# Patient Record
Sex: MALE | Race: WHITE | NOT HISPANIC OR LATINO | Employment: FULL TIME | URBAN - METROPOLITAN AREA
[De-identification: names, ages, dates, MRNs, and addresses within clinical notes are randomized per-mention and may not be internally consistent; named-entity substitution may affect disease eponyms.]

---

## 2017-01-23 ENCOUNTER — OFFICE VISIT (OUTPATIENT)
Dept: FAMILY MEDICINE | Facility: CLINIC | Age: 32
End: 2017-01-23
Payer: COMMERCIAL

## 2017-01-23 VITALS
RESPIRATION RATE: 12 BRPM | HEIGHT: 71 IN | OXYGEN SATURATION: 99 % | TEMPERATURE: 98.7 F | BODY MASS INDEX: 30.45 KG/M2 | HEART RATE: 89 BPM | SYSTOLIC BLOOD PRESSURE: 140 MMHG | DIASTOLIC BLOOD PRESSURE: 80 MMHG | WEIGHT: 217.5 LBS

## 2017-01-23 DIAGNOSIS — F41.1 GENERALIZED ANXIETY DISORDER: ICD-10-CM

## 2017-01-23 DIAGNOSIS — R07.0 THROAT PAIN: Primary | ICD-10-CM

## 2017-01-23 PROCEDURE — 99214 OFFICE O/P EST MOD 30 MIN: CPT | Performed by: FAMILY MEDICINE

## 2017-01-23 PROCEDURE — 36415 COLL VENOUS BLD VENIPUNCTURE: CPT | Performed by: FAMILY MEDICINE

## 2017-01-23 PROCEDURE — 84443 ASSAY THYROID STIM HORMONE: CPT | Performed by: FAMILY MEDICINE

## 2017-01-23 RX ORDER — LORAZEPAM 0.5 MG/1
.5-1 TABLET ORAL
Qty: 14 TABLET | Refills: 0 | Status: SHIPPED | OUTPATIENT
Start: 2017-01-23 | End: 2017-02-07

## 2017-01-23 ASSESSMENT — ANXIETY QUESTIONNAIRES
GAD7 TOTAL SCORE: 17
1. FEELING NERVOUS, ANXIOUS, OR ON EDGE: NEARLY EVERY DAY
2. NOT BEING ABLE TO STOP OR CONTROL WORRYING: NEARLY EVERY DAY
3. WORRYING TOO MUCH ABOUT DIFFERENT THINGS: NEARLY EVERY DAY
5. BEING SO RESTLESS THAT IT IS HARD TO SIT STILL: SEVERAL DAYS
IF YOU CHECKED OFF ANY PROBLEMS ON THIS QUESTIONNAIRE, HOW DIFFICULT HAVE THESE PROBLEMS MADE IT FOR YOU TO DO YOUR WORK, TAKE CARE OF THINGS AT HOME, OR GET ALONG WITH OTHER PEOPLE: SOMEWHAT DIFFICULT
7. FEELING AFRAID AS IF SOMETHING AWFUL MIGHT HAPPEN: NEARLY EVERY DAY
6. BECOMING EASILY ANNOYED OR IRRITABLE: MORE THAN HALF THE DAYS

## 2017-01-23 ASSESSMENT — PATIENT HEALTH QUESTIONNAIRE - PHQ9: 5. POOR APPETITE OR OVEREATING: MORE THAN HALF THE DAYS

## 2017-01-23 NOTE — PROGRESS NOTES
"    SUBJECTIVE:                                                    Rosalio Odonnell is a 31 year old male who presents to clinic today for the following health issues:        Depression and Anxiety Follow-Up    Status since last visit: Worsened because of work in the last month     Other associated symptoms:tightness around throat    Complicating factors:     Significant life event: No     Current substance abuse: None    PHQ-9 SCORE 4/20/2012   Total Score 0     No flowsheet data found.     PHQ-9  English      PHQ-9   Any Language     GAD7         Amount of exercise or physical activity: 4-5 days/week for an average of 45-60 minutes    Problems taking medications regularly: No    Medication side effects: none    Diet: regular (no restrictions) was on a detox diet at the beginning of the month    Additional history/life update:    Throat pain:  Throat tightness over the last 2 weeks. Associated with feelings of anxiety. Has been experiencing increased levels of stress at work as an . Difficulty sleeping 2/2 general worry about health, work, etc. Concerned about thyroid disorder. States that his dad had thyroid issues (unspecified). No other symptoms other than \"throat tightness.\" Denies issues with swallowing or breathing difficulties. No wheezing. No heat or cold intolerance.     Generalized anxiety disorder :  History of generalized anxiety. Took SSRIs in past but discontinued these ~2 years ago as he felt they were not working. Continues to have intermittent symptoms of anxiety, but recently these have worsened with increased stress at work.       Problem list, Medication list, Allergies, and Medical/Social/Surgical histories reviewed in [facility] and updated as appropriate.  Labs reviewed in EPIC  BP Readings from Last 3 Encounters:   01/23/17 140/80   02/22/16 128/76   11/11/15 126/76    Wt Readings from Last 3 Encounters:   01/23/17 217 lb 8 oz (98.657 kg)   02/22/16 209 lb 8 oz (95.029 kg)   11/11/15 224 lb " "(101.606 kg)                  Patient Active Problem List   Diagnosis     Generalized anxiety disorder     CARDIOVASCULAR SCREENING; LDL GOAL LESS THAN 160     History reviewed. No pertinent past surgical history.    Social History   Substance Use Topics     Smoking status: Never Smoker      Smokeless tobacco: Never Used     Alcohol Use: Yes      Comment: rare     History reviewed. No pertinent family history.      Current Outpatient Prescriptions   Medication Sig Dispense Refill     LORazepam (ATIVAN) 0.5 MG tablet Take 1-2 tablets (0.5-1 mg) by mouth nightly as needed for anxiety 14 tablet 0     FLUOXETINE HCL PO        PARoxetine (PAXIL) 40 MG tablet Take by mouth every morning 30 tablet      Allergies   Allergen Reactions     Amoxicillin Rash     Recent Labs   Lab Test  11/11/15   1311  02/10/14   1514   LDL  69  79   HDL  60  59   TRIG  106  53   TSH   --   2.27        REVIEW OF SYSTEMS FOR GENERAL, RESPIRATORY, CV, GI AND PSYCHIATRIC NEGATIVE EXCEPT AS NOTED IN HPI.       OBJECTIVE:  /80 mmHg  Pulse 89  Temp(Src) 98.7  F (37.1  C) (Oral)  Resp 12  Ht 5' 10.75\" (1.797 m)  Wt 217 lb 8 oz (98.657 kg)  BMI 30.55 kg/m2  SpO2 99%    EXAM:  GENERAL APPEARANCE: obese young man, pleasant and interactive, alert appears slightly anxious but not in any acute distress  RESP: lungs clear to auscultation - no rales, rhonchi or wheezes  CV: regular rates and rhythm, normal S1 S2, no S3 or S4 and no murmur, click or rub -  NECK:  Supple. Full range of motion. Appears normal. No evidence of goiter or lymphadenopathy. Swallows without issue. No stridor.   No thyroid masses.       ASSESSMENT AND PLAN  Patient Instructions   ASSESSMENT AND PLAN  1. Throat pain  Thyroid labs today.      Overall seems related to acute anxiety although may have contribution of heartburn/acid or esophageal spasm which can go along with this.     Treat by taking ranitidine over the counter 150mg twice daily for one week and also using " lorazepam 1-2 tabs at night for one week to reduce anxiety level and re-gain sleep.  (only sleeping 2-4 hours/night due to this. )     2. Generalized anxiety disorder    - LORazepam (ATIVAN) 0.5 MG tablet; Take 1-2 tablets (0.5-1 mg) by mouth nightly as needed for anxiety  Dispense: 14 tablet; Refill: 0    Discussed risks of lorazepam, sedation, do not drive after taking.     please put in Bahai for 2:20 on february 7th for anxi  ety fu    Continue paroxetine for now.     MYCHART SIGNUP FOR E-VISITS AND EASIER COMMUNICATION:  [email or web address]     Zipnosis:  [employer].U4EA.Fredio.  Sign up for e-visits for common illnesses!     RADIOLOGY:   [facility]:  [phone] to schedule any radiology tests at [facility]: [phone]    Mammograms/colonoscopies:  [phone]    CONSUMER PRICE LINE for estimates of test costs:  [phone]                Scribed by Tanmay Garcia, Medical Student for Joel Wegener, MD based on the provider s statements to me.        I have reviewed and edited the medical student s documentation acting as scribe and verify that the history, exam and medical decision making reflect the history, exam and decision making performed by myself today.

## 2017-01-23 NOTE — MR AVS SNAPSHOT
After Visit Summary   1/23/2017    Rosalio Odonnell    MRN: [id number]           Patient Information     Date Of Birth          1985        Visit Information        Provider Department      1/23/2017 3:40 PM Wegener, Joel Daniel Irwin, MD [facility] [place]        Today's Diagnoses     Throat pain    -  1     Generalized anxiety disorder           Care Instructions    ASSESSMENT AND PLAN  1. Throat pain  Thyroid labs today.      Overall seems related to acute anxiety although may have contribution of heartburn/acid or esophageal spasm which can go along with this.     Treat by taking ranitidine over the counter 150mg twice daily for one week and also using lorazepam 1-2 tabs at night for one week to reduce anxiety level and re-gain sleep.  (only sleeping 2-4 hours/night due to this. )     2. Generalized anxiety disorder    - LORazepam (ATIVAN) 0.5 MG tablet; Take 1-2 tablets (0.5-1 mg) by mouth nightly as needed for anxiety  Dispense: 14 tablet; Refill: 0    please put in Yarsanism for 2:20 on february 7th for anxiety fu      [place] SIGNUP FOR E-VISITS AND EASIER COMMUNICATION:  [email or web address]     Zipnosis:  [place].[employer].Searcheeze.  Sign up for e-visits for common illnesses!     RADIOLOGY:   [facility]:  [phone] to schedule any radiology tests at [facility]: [phone]    Mammograms/colonoscopies:  [phone]    CONSUMER PRICE LINE for estimates of test costs:  [phone]               Follow-ups after your visit        Who to contact     If you have questions or need follow up information about today's clinic visit or your schedule please contact [facility] directly at [phone].  Normal or non-critical lab and imaging results will be communicated to you by MyChart, letter or phone within 4 business days after the clinic has received the results. If you do not hear from us within 7 days, please contact the clinic through  "MyChart or phone. If you have a critical or abnormal lab result, we will notify you by phone as soon as possible.  Submit refill requests through [employer] or call your pharmacy and they will forward the refill request to us. Please allow 3 business days for your refill to be completed.          Additional Information About Your Visit        PlayB[employer] Information     [employer] gives you secure access to your electronic health record. If you see a primary care provider, you can also send messages to your care team and make appointments. If you have questions, please call your primary care clinic.  If you do not have a primary care provider, please call [phone] and they will assist you.        Care EveryWhere ID     This is your Care EveryWhere ID. This could be used by other organizations to access your [facility] medical records  [id number]        Your Vitals Were     Pulse Temperature Respirations Height BMI (Body Mass Index) Pulse Oximetry    89 98.7  F (37.1  C) (Oral) 12 5' 10.75\" (1.797 m) 30.55 kg/m2 99%       Blood Pressure from Last 3 Encounters:   01/23/17 140/80   02/22/16 128/76   11/11/15 126/76    Weight from Last 3 Encounters:   01/23/17 217 lb 8 oz (98.657 kg)   02/22/16 209 lb 8 oz (95.029 kg)   11/11/15 224 lb (101.606 kg)              We Performed the Following     TSH with free T4 reflex          Today's Medication Changes          These changes are accurate as of: 1/23/17  4:46 PM.  If you have any questions, ask your nurse or doctor.               Start taking these medicines.        Dose/Directions    LORazepam 0.5 MG tablet   Commonly known as:  ATIVAN   Used for:  Generalized anxiety disorder   Started by:  Wegener, Joel Daniel Irwin, MD        Dose:  0.5-1 mg   Take 1-2 tablets (0.5-1 mg) by mouth nightly as needed for anxiety   Quantity:  14 tablet   Refills:  0            Where to get your medicines      Some of these will need a paper prescription and others can be bought over the counter. "  Ask your nurse if you have questions.     Bring a paper prescription for each of these medications    - LORazepam 0.5 MG tablet             Primary Care Provider Office Phone # Fax #    Joel Daniel Irwin Wegener, [phone]       [facility] [address] [facility] [address]        Thank you!     Thank you for choosing [facility]  for your care. Our goal is always to provide you with excellent care. Hearing back from our patients is one way we can continue to improve our services. Please take a few minutes to complete the written survey that you may receive in the mail after your visit with us. Thank you!             Your Updated Medication List - Protect others around you: Learn how to safely use, store and throw away your medicines at www.disposemymeds.org.          This list is accurate as of: 1/23/17  4:46 PM.  Always use your most recent med list.                   Brand Name Dispense Instructions for use    FLUOXETINE HCL PO          LORazepam 0.5 MG tablet    ATIVAN    14 tablet    Take 1-2 tablets (0.5-1 mg) by mouth nightly as needed for anxiety       PARoxetine 40 MG tablet    PAXIL    30 tablet    Take by mouth every morning

## 2017-01-23 NOTE — PATIENT INSTRUCTIONS
ASSESSMENT AND PLAN  1. Throat pain  Thyroid labs today.      Overall seems related to acute anxiety although may have contribution of heartburn/acid or esophageal spasm which can go along with this.     Treat by taking ranitidine over the counter 150mg twice daily for one week and also using lorazepam 1-2 tabs at night for one week to reduce anxiety level and re-gain sleep.  (only sleeping 2-4 hours/night due to this. )     2. Generalized anxiety disorder    - LORazepam (ATIVAN) 0.5 MG tablet; Take 1-2 tablets (0.5-1 mg) by mouth nightly as needed for anxiety  Dispense: 14 tablet; Refill: 0    please put in Jewish for 2:20 on february 7th for anxiety fu      [place] SIGNUP FOR E-VISITS AND EASIER COMMUNICATION:  [email or web address]     Zipnosis:  [place].[employer]ivalSoft.  Sign up for e-visits for common illnesses!     RADIOLOGY:   [employer]:  [phone] to schedule any radiology tests at [facility]: [phone]    Mammograms/colonoscopies:  [phone]    CONSUMER PRICE LINE for estimates of test costs:  [phone]

## 2017-01-23 NOTE — NURSING NOTE
"Chief Complaint   Patient presents with     Anxiety     Depression       Initial /80 mmHg  Pulse 89  Temp(Src) 98.7  F (37.1  C) (Oral)  Resp 12  Ht 5' 10.75\" (1.797 m)  Wt 217 lb 8 oz (98.657 kg)  BMI 30.55 kg/m2  SpO2 99% Estimated body mass index is 30.55 kg/(m^2) as calculated from the following:    Height as of this encounter: 5' 10.75\" (1.797 m).    Weight as of this encounter: 217 lb 8 oz (98.657 kg).  BP completed using cuff size: SMA Gaetano    "

## 2017-01-24 LAB — TSH SERPL DL<=0.005 MIU/L-ACNC: 2.21 MU/L (ref 0.4–4)

## 2017-01-24 ASSESSMENT — ANXIETY QUESTIONNAIRES: GAD7 TOTAL SCORE: 17

## 2017-01-24 ASSESSMENT — PATIENT HEALTH QUESTIONNAIRE - PHQ9: SUM OF ALL RESPONSES TO PHQ QUESTIONS 1-9: 9

## 2017-02-06 NOTE — PROGRESS NOTES
"  SUBJECTIVE:                                                    Rosalio Odonnell is a 31 year old male who presents to clinic today for the following health issues:      Anxiety Follow-Up    Status since last visit: Improved slighly    Other associated symptoms:None    Complicating factors:   Significant life event: No   Current substance abuse: None  Depression symptoms: No  ANGELINA-7 SCORE 1/23/2017   Total Score 17        GAD7       Amount of exercise or physical activity: 6-7 days/week for an average of 45-60 minutes    Problems taking medications regularly: No    Medication side effects: none    Diet: regular (no restrictions)    Additional history/life update:    Generalized anxiety disorder :  Anxiety improved but still experiences symptoms of anxiety triggered by small things (a pimple on his leg) and then exacerbated by anxious thoughts. He reports that the lorazepam appears to have helped a lot when it was \"really bad\" and to help sleep.  Has tried hard to limit taking this however.         Problem list, Medication list, Allergies, and Medical/Social/Surgical histories reviewed in [facility] and updated as appropriate.  Labs reviewed in EPIC  BP Readings from Last 3 Encounters:   02/07/17 138/86   01/23/17 140/80   02/22/16 128/76    Wt Readings from Last 3 Encounters:   02/07/17 213 lb 8 oz (96.843 kg)   01/23/17 217 lb 8 oz (98.657 kg)   02/22/16 209 lb 8 oz (95.029 kg)                  Patient Active Problem List   Diagnosis     Generalized anxiety disorder     CARDIOVASCULAR SCREENING; LDL GOAL LESS THAN 160     No past surgical history on file.    Social History   Substance Use Topics     Smoking status: Never Smoker      Smokeless tobacco: Never Used     Alcohol Use: Yes      Comment: rare     No family history on file.      Current Outpatient Prescriptions   Medication Sig Dispense Refill     FLUOXETINE HCL PO        PARoxetine (PAXIL) 40 MG tablet Take by mouth every morning 30 tablet      Allergies   Allergen " "Reactions     Amoxicillin Rash     Recent Labs   Lab Test  01/23/17   1649  11/11/15   1311  02/10/14   1514   LDL   --   69  79   HDL   --   60  59   TRIG   --   106  53   TSH  2.21   --   2.27        REVIEW OF SYSTEMS FOR GENERAL, RESPIRATORY, CV, GI AND PSYCHIATRIC NEGATIVE EXCEPT AS NOTED IN HPI.       OBJECTIVE:  /86 mmHg  Pulse 61  Temp(Src) 97.7  F (36.5  C) (Oral)  Resp 20  Ht 5' 10.75\" (1.797 m)  Wt 213 lb 8 oz (96.843 kg)  BMI 29.99 kg/m2  SpO2 98%    EXAM:  GENERAL APPEARANCE: healthy, alert and no distress  RESP: lungs clear to auscultation - no rales, rhonchi or wheezes  CV: regular rates and rhythm, normal S1 S2, no S3 or S4 and no murmur, click or rub -    MENTAL STATUS EXAM  Appearance: appropriate  Attitude: cooperative  Behavior: normal  Eyre Contact: normal  Speech: normal, clear, not slurred.   Orientation: oreinted to person , place, time and situation  Mood:  admits little sadness but some anxiety most of time  Affect: Mood Congruient  Thought Process: clear  Suicidal Ideation: reports no thoughts, no intention  Hallucination: no      ASSESSMENT AND PLAN  1. Generalized anxiety disorder  Overall much improved. Used only 1/2 lorazepam.     Discussed options.  Will add night journaling \"out with the bad and in with the good\" for 5 min/night.     Was not taking any controlled medications.  Used both paxil and fluoxetine in the past, most recently fluoxetine 40mg.     No side effects that he remembers.     Start fluoxetine 20mg daily.  Common side effects nausea/stomach upset.  Sometimes erectile dysfunction     Follow up one month by e-visit.     Refilled small amount of lorazepam just in case.  Do not drive after taking or mix with alcohol.     Could also consider psychotherapy, he may ask insurance about coverage first.     please put in Islam for anxiety fu at 2:00 on july 10th      Scribed by Tanmay Garcia, Medical Student for Joel Wegener, MD based on the provider s statements " to me.       I have reviewed and edited the medical student s documentation acting as scribe and verify that the history, exam and medical decision making reflect the history, exam and decision making performed by myself today.    Spent more than 1/2 of 25 minutes counseling/coordination of care regarding anxiety today.     Joel Wegener,MD

## 2017-02-07 ENCOUNTER — OFFICE VISIT (OUTPATIENT)
Dept: FAMILY MEDICINE | Facility: CLINIC | Age: 32
End: 2017-02-07
Payer: COMMERCIAL

## 2017-02-07 VITALS
TEMPERATURE: 97.7 F | SYSTOLIC BLOOD PRESSURE: 138 MMHG | WEIGHT: 213.5 LBS | RESPIRATION RATE: 20 BRPM | DIASTOLIC BLOOD PRESSURE: 86 MMHG | HEART RATE: 61 BPM | BODY MASS INDEX: 29.89 KG/M2 | HEIGHT: 71 IN | OXYGEN SATURATION: 98 %

## 2017-02-07 DIAGNOSIS — F41.1 GENERALIZED ANXIETY DISORDER: Primary | ICD-10-CM

## 2017-02-07 PROCEDURE — 99214 OFFICE O/P EST MOD 30 MIN: CPT | Performed by: FAMILY MEDICINE

## 2017-02-07 RX ORDER — LORAZEPAM 0.5 MG/1
.5-1 TABLET ORAL
Qty: 14 TABLET | Refills: 0 | Status: SHIPPED | OUTPATIENT
Start: 2017-02-07 | End: 2018-02-20

## 2017-02-07 NOTE — NURSING NOTE
"Chief Complaint   Patient presents with     Anxiety       Initial /86 mmHg  Pulse 61  Temp(Src) 97.7  F (36.5  C) (Oral)  Resp 20  Ht 5' 10.75\" (1.797 m)  Wt 213 lb 8 oz (96.843 kg)  BMI 29.99 kg/m2  SpO2 98% Estimated body mass index is 29.99 kg/(m^2) as calculated from the following:    Height as of this encounter: 5' 10.75\" (1.797 m).    Weight as of this encounter: 213 lb 8 oz (96.843 kg).  Medication Reconciliation: complete   Carlee Ann CMA      "

## 2017-02-07 NOTE — PATIENT INSTRUCTIONS
"ASSESSMENT AND PLAN  1. Generalized anxiety disorder  Overall much improved. Used only 1/2 lorazepam.     Discussed options.  Will add night journaling \"out with the bad and in with the good\" for 5 min/night.     Was not taking any controlled medications.  Used both paxil and fluoxetine in the past, most recently fluoxetine 40mg.     No side effects that he remembers.     Start fluoxetine 20mg daily.  Common side effects nausea/stomach upset.  Sometimes erectile dysfunction     Follow up one month by e-visit.     Refilled small amount of lorazepam just in case.  Do not drive after taking or mix with alcohol.     Could also consider psychotherapy, he may ask insurance about coverage first.     please put in Yazidi for anxiety fu at 2:00 on july 10th        [facility] SIGNUP FOR E-VISITS AND EASIER COMMUNICATION:  [email or web address]     Zipnosis:  [place].[employer].9Lenses.  Sign up for e-visits for common illnesses!     RADIOLOGY:   [facility]:  [phone] to schedule any radiology tests at [facility]: [phone]    Mammograms/colonoscopies:  [phone]    CONSUMER PRICE LINE for estimates of test costs:  [phone]         "

## 2017-02-07 NOTE — MR AVS SNAPSHOT
"              After Visit Summary   2/7/2017    Carri Odonnell    MRN: [id number]           Patient Information     Date Of Birth          1985        Visit Information        Provider Department      2/7/2017 2:20 PM Wegener, Joel Daniel Irwin, MD [facility]        Today's Diagnoses     Generalized anxiety disorder    -  1       Care Instructions    ASSESSMENT AND PLAN  1. Generalized anxiety disorder  Overall much improved. Used only 1/2 lorazepam.     Discussed options.  Will add night journaling \"out with the bad and in with the good\" for 5 min/night.     Was not taking any controlled medications.  Used both paxil and fluoxetine in the past, most recently fluoxetine 40mg.     No side effects that he remembers.     Start fluoxetine 20mg daily.  Common side effects nausea/stomach upset.  Sometimes erectile dysfunction     Follow up one month by e-visit.     Refilled small amount of lorazepam just in case.  Do not drive after taking or mix with alcohol.     please put in carri for anxiety fu at 2:00 on july 10th        [employer] SIGNUP FOR E-VISITS AND EASIER COMMUNICATION:  [email or web address]     Zipnosis:  [place].[employer].  Sign up for e-visits for common illnesses!     RADIOLOGY:   [facility]:  [phone] to schedule any radiology tests at [facility]: [phone]    Mammograms/colonoscopies:  [phone]    CONSUMER PRICE LINE for estimates of test costs:  [phone]               Follow-ups after your visit        Who to contact     If you have questions or need follow up information about today's clinic visit or your schedule please contact [facility] directly at [phone].  Normal or non-critical lab and imaging results will be communicated to you by MyChart, letter or phone within 4 business days after the clinic has received the results. If you do not hear from us within 7 days, please contact the clinic through [employer] or " "phone. If you have a critical or abnormal lab result, we will notify you by phone as soon as possible.  Submit refill requests through [facility] or call your pharmacy and they will forward the refill request to us. Please allow 3 business days for your refill to be completed.          Additional Information About Your Visit        Allegiancehart Information     [facility] gives you secure access to your electronic health record. If you see a primary care provider, you can also send messages to your care team and make appointments. If you have questions, please call your primary care clinic.  If you do not have a primary care provider, please call [phone] and they will assist you.        Care EveryWhere ID     This is your Care EveryWhere ID. This could be used by other organizations to access your [place] medical records  [id number]        Your Vitals Were     Pulse Temperature Respirations Height BMI (Body Mass Index) Pulse Oximetry    61 97.7  F (36.5  C) (Oral) 20 5' 10.75\" (1.797 m) 29.99 kg/m2 98%       Blood Pressure from Last 3 Encounters:   02/07/17 138/86   01/23/17 140/80   02/22/16 128/76    Weight from Last 3 Encounters:   02/07/17 213 lb 8 oz (96.843 kg)   01/23/17 217 lb 8 oz (98.657 kg)   02/22/16 209 lb 8 oz (95.029 kg)              Today, you had the following     No orders found for display         Today's Medication Changes          These changes are accurate as of: 2/7/17  3:36 PM.  If you have any questions, ask your nurse or doctor.               Start taking these medicines.        Dose/Directions    FLUoxetine 20 MG capsule   Commonly known as:  PROzac   Used for:  Generalized anxiety disorder   Started by:  Wegener, Joel Daniel Irwin, MD        Dose:  20 mg   Take 1 capsule (20 mg) by mouth daily   Quantity:  90 capsule   Refills:  1       LORazepam 0.5 MG tablet   Commonly known as:  ATIVAN   Used for:  Generalized anxiety disorder   Started by:  Wegener, Joel Daniel Irwin, MD        Dose:  " 0.5-1 mg   Take 1-2 tablets (0.5-1 mg) by mouth nightly as needed for anxiety   Quantity:  14 tablet   Refills:  0            Where to get your medicines      These medications were sent to [facility]pharmacy #[id number] - [place], [place] - [address]  [address] [address]     Phone:  [phone]    - FLUoxetine 20 MG capsule      Some of these will need a paper prescription and others can be bought over the counter.  Ask your nurse if you have questions.     Bring a paper prescription for each of these medications    - LORazepam 0.5 MG tablet             Primary Care Provider Office Phone # Fax #    Joel Daniel Irwin Wegener, [phone]       [address] [address]        Thank you!     Thank you for choosing [facility]  for your care. Our goal is always to provide you with excellent care. Hearing back from our patients is one way we can continue to improve our services. Please take a few minutes to complete the written survey that you may receive in the mail after your visit with us. Thank you!             Your Updated Medication List - Protect others around you: Learn how to safely use, store and throw away your medicines at www.disposemymeds.org.          This list is accurate as of: 2/7/17  3:36 PM.  Always use your most recent med list.                   Brand Name Dispense Instructions for use    FLUoxetine 20 MG capsule    PROzac    90 capsule    Take 1 capsule (20 mg) by mouth daily       FLUOXETINE HCL PO          LORazepam 0.5 MG tablet    ATIVAN    14 tablet    Take 1-2 tablets (0.5-1 mg) by mouth nightly as needed for anxiety       PARoxetine 40 MG tablet    PAXIL    30 tablet    Take by mouth every morning

## 2017-02-22 ENCOUNTER — E-VISIT (OUTPATIENT)
Dept: FAMILY MEDICINE | Facility: CLINIC | Age: 32
End: 2017-02-22
Payer: COMMERCIAL

## 2017-02-22 DIAGNOSIS — F41.1 GENERALIZED ANXIETY DISORDER: ICD-10-CM

## 2017-02-22 DIAGNOSIS — M77.10 LATERAL EPICONDYLITIS, UNSPECIFIED LATERALITY: Primary | ICD-10-CM

## 2017-02-22 PROCEDURE — 99444 ZZC PHYSICIAN ONLINE EVALUATION & MANAGEMENT SERVICE: CPT | Performed by: FAMILY MEDICINE

## 2017-02-23 ENCOUNTER — OFFICE VISIT (OUTPATIENT)
Dept: URGENT CARE | Facility: URGENT CARE | Age: 32
End: 2017-02-23
Payer: COMMERCIAL

## 2017-02-23 VITALS
HEIGHT: 71 IN | OXYGEN SATURATION: 98 % | TEMPERATURE: 99.1 F | WEIGHT: 210 LBS | BODY MASS INDEX: 29.4 KG/M2 | DIASTOLIC BLOOD PRESSURE: 87 MMHG | SYSTOLIC BLOOD PRESSURE: 136 MMHG | HEART RATE: 66 BPM

## 2017-02-23 DIAGNOSIS — S56.912A STRAIN OF LEFT FOREARM, INITIAL ENCOUNTER: Primary | ICD-10-CM

## 2017-02-23 PROCEDURE — 99213 OFFICE O/P EST LOW 20 MIN: CPT | Performed by: FAMILY MEDICINE

## 2017-02-23 NOTE — MR AVS SNAPSHOT
After Visit Summary   2/23/2017    Rosalio Odonnell    MRN: [id number]           Patient Information     Date Of Birth          1985        Visit Information        Provider Department      2/23/2017 6:15 PM Angela Cervantes MD [facility]        Today's Diagnoses     Strain of left forearm, initial encounter    -  1       Follow-ups after your visit        Your next 10 appointments already scheduled     Jul 10, 2017  2:00 PM CDT   Office Visit with Joel Daniel Irwin Wegener, MD   [facility] ([facility])    [address] [place] [id number] [phone]           Bring a current list of meds and any records pertaining to this visit.  For Physicals, please bring immunization records and any forms needing to be filled out.  Please arrive 10 minutes early to complete paperwork.              Who to contact     If you have questions or need follow up information about today's clinic visit or your schedule please contact [facility] directly at [phone].  Normal or non-critical lab and imaging results will be communicated to you by [employer], letter or phone within 4 business days after the clinic has received the results. If you do not hear from us within 7 days, please contact the clinic through [employer] or phone. If you have a critical or abnormal lab result, we will notify you by phone as soon as possible.  Submit refill requests through [employer] or call your pharmacy and they will forward the refill request to us. Please allow 3 business days for your refill to be completed.          Additional Information About Your Visit        [employer] Information     [employer] gives you secure access to your electronic health record. If you see a primary care provider, you can also send messages to your care team and make appointments. If you have questions, please call your primary care clinic.  If you do not  "have a primary care provider, please call [phone] and they will assist you.        Care EveryWhere ID     This is your Care EveryWhere ID. This could be used by other organizations to access your [place] medical records  [id number]        Your Vitals Were     Pulse Temperature Height Pulse Oximetry BMI (Body Mass Index)       66 99.1  F (37.3  C) (Tympanic) 5' 11\" (1.803 m) 98% 29.29 kg/m2        Blood Pressure from Last 3 Encounters:   02/23/17 136/87   02/07/17 138/86   01/23/17 140/80    Weight from Last 3 Encounters:   02/23/17 210 lb (95.3 kg)   02/07/17 213 lb 8 oz (96.8 kg)   01/23/17 217 lb 8 oz (98.7 kg)              Today, you had the following     No orders found for display       Primary Care Provider Office Phone # Fax #    Joel Daniel Irwin Wegener, [phone]       [place] [phone]        Thank you!     Thank you for choosing [facility]  for your care. Our goal is always to provide you with excellent care. Hearing back from our patients is one way we can continue to improve our services. Please take a few minutes to complete the written survey that you may receive in the mail after your visit with us. Thank you!             Your Updated Medication List - Protect others around you: Learn how to safely use, store and throw away your medicines at www.disposemymeds.org.          This list is accurate as of: 2/23/17  7:57 PM.  Always use your most recent med list.                   Brand Name Dispense Instructions for use    FLUoxetine 20 MG capsule    PROzac    90 capsule    Take 1 capsule (20 mg) by mouth daily       FLUOXETINE HCL PO      Reported on 2/23/2017       LORazepam 0.5 MG tablet    ATIVAN    14 tablet    Take 1-2 tablets (0.5-1 mg) by mouth nightly as needed for anxiety       PARoxetine 40 MG tablet    PAXIL    30 tablet    Take by mouth every morning Reported on 2/23/2017         "

## 2017-02-24 NOTE — NURSING NOTE
"Chief Complaint   Patient presents with     Urgent Care     Elbow Pain     c/o elbow sore for 3 days , no injury       Initial /87 (BP Location: Left arm, Patient Position: Chair, Cuff Size: Adult Large)  Pulse 66  Temp 99.1  F (37.3  C) (Tympanic)  Ht 5' 11\" (1.803 m)  Wt 210 lb (95.3 kg)  SpO2 98%  BMI 29.29 kg/m2 Estimated body mass index is 29.29 kg/(m^2) as calculated from the following:    Height as of this encounter: 5' 11\" (1.803 m).    Weight as of this encounter: 210 lb (95.3 kg).  Medication Reconciliation: complete   Brenda Cuellar MA      "

## 2017-02-24 NOTE — PROGRESS NOTES
SUBJECTIVE:  Rosalio Odonnell is a 31 year old male who c/o left elbow area tightness and soreness lateral > medial aspect for the past 3 days. Mechanism of injury: increased his work out regiment to 100 push ups using weights about 7 days ago. Immediate symptoms: delayed pain, was able to use arm directly after injury. Symptoms have been improving since that time.  Has been using Ibuprofen.  He is concerned that although the pain is better, he feels medial left elbow twitching once in a while.  He is right handed.  Denies tingling of hands.  Prior history of related problems: no prior problems with this area in the past.    OBJECTIVE:  Vital signs as noted above.  Appearance: in no apparent distress.  Left elbow exam: mild lateral epicondylar tenderness, mild medial epicondylar tenderness.  Full elbow ROM.  Mild forearm extensor muscle tenderness.  Normal strength.  Normal radial pulse.  Normal ipsilateral wrist exam.  No twiching noted.     X-ray: not indicated.    ASSESSMENT:  Left forearm strain    PLAN:  NSAID, ice suggested  activity modification  see primary care physician in follow up if symptoms do not improve in the next week or sooner if worsening.   See orders in EpicCare.  Angela Hidalgo MD

## 2017-03-08 ENCOUNTER — OFFICE VISIT (OUTPATIENT)
Dept: FAMILY MEDICINE | Facility: CLINIC | Age: 32
End: 2017-03-08
Payer: COMMERCIAL

## 2017-03-08 VITALS
HEART RATE: 92 BPM | DIASTOLIC BLOOD PRESSURE: 92 MMHG | TEMPERATURE: 98.2 F | WEIGHT: 214 LBS | OXYGEN SATURATION: 99 % | SYSTOLIC BLOOD PRESSURE: 154 MMHG | RESPIRATION RATE: 16 BRPM | BODY MASS INDEX: 29.85 KG/M2

## 2017-03-08 DIAGNOSIS — F41.1 GAD (GENERALIZED ANXIETY DISORDER): ICD-10-CM

## 2017-03-08 DIAGNOSIS — R25.3 MUSCLE TWITCHING: Primary | ICD-10-CM

## 2017-03-08 PROCEDURE — 99213 OFFICE O/P EST LOW 20 MIN: CPT | Performed by: NURSE PRACTITIONER

## 2017-03-08 RX ORDER — CITALOPRAM HYDROBROMIDE 20 MG/1
TABLET ORAL
Qty: 30 TABLET | Refills: 1 | Status: SHIPPED | OUTPATIENT
Start: 2017-03-08 | End: 2017-04-07

## 2017-03-08 NOTE — MR AVS SNAPSHOT
After Visit Summary   3/8/2017    Rosalio Odonnell    MRN: [id number]           Patient Information     Date Of Birth          1985        Visit Information        Provider Department      3/8/2017 11:00 AM Amy Jimenez APRN [facility]        Today's Diagnoses     ANGELINA (generalized anxiety disorder)    -  1    Muscle twitching          Care Instructions    1.  I think twitching is related to recent increase in exercise, make sure you stretch before and after workouts, in the morning, and before bed.    2.  For anxiety let's try a less activating SSRI called celexa.  Take 1-mg (1/2 tab) for 1 week then increase to a full tab.  It will take 4-6 weeks to see full effect.  Side effects include decreased sex drive, drowsiness, weight gain, insomnia, anxiety, dizziness, headache, and nausea.  Some of these get better after the first 2-4 weeks.  We may have to try several different medications before we find the one that's right for you.      3.  Follow up in 1 month for recheck    4.  Consider establishing with a counselor             Follow-ups after your visit        Additional Services     MENTAL HEALTH REFERRAL       Your provider has referred you to: [facility]: [facility] - Counseling (Individual/Couples/Family) - [facility] [phone]   [email or web address]   *Patient will be contacted by [facility] scheduling partner[facility]), to schedule an appointment.  Patients may also call [facility] to schedule.    All scheduling is subject to the client's specific insurance plan & benefits, provider/location availability, and provider clinical specialities.  Please arrive 15 minutes early for your first appointment and bring your completed paperwork.    Please be aware that coverage of these services is subject to the terms and limitations of your health insurance plan.  Call  member services at your health plan with any benefit or coverage questions.                  Your next 10 appointments already scheduled     Jul 10, 2017  2:00 PM CDT   Office Visit with Joel Daniel Irwin Wegener, MD   [facility] ([facility])    [address] [place] [id number] [phone]           Bring a current list of meds and any records pertaining to this visit.  For Physicals, please bring immunization records and any forms needing to be filled out.  Please arrive 10 minutes early to complete paperwork.              Who to contact     If you have questions or need follow up information about today's clinic visit or your schedule please contact [facility] directly at [phone].  Normal or non-critical lab and imaging results will be communicated to you by MyChart, letter or phone within 4 business days after the clinic has received the results. If you do not hear from us within 7 days, please contact the clinic through [employer] or phone. If you have a critical or abnormal lab result, we will notify you by phone as soon as possible.  Submit refill requests through [employer] or call your pharmacy and they will forward the refill request to us. Please allow 3 business days for your refill to be completed.          Additional Information About Your Visit        [employer] Information     [employer] gives you secure access to your electronic health record. If you see a primary care provider, you can also send messages to your care team and make appointments. If you have questions, please call your primary care clinic.  If you do not have a primary care provider, please call [phone] and they will assist you.        Care EveryWhere ID     This is your Care EveryWhere ID. This could be used by other organizations to access your [place] medical records  [id number]        Your Vitals Were     Pulse Temperature Respirations Pulse Oximetry BMI (Body Mass  Index)       92 98.2  F (36.8  C) (Tympanic) 16 99% 29.85 kg/m2        Blood Pressure from Last 3 Encounters:   03/08/17 (!) 154/92   02/23/17 136/87   02/07/17 138/86    Weight from Last 3 Encounters:   03/08/17 214 lb (97.1 kg)   02/23/17 210 lb (95.3 kg)   02/07/17 213 lb 8 oz (96.8 kg)              We Performed the Following     MENTAL HEALTH REFERRAL          Today's Medication Changes          These changes are accurate as of: 3/8/17 11:22 AM.  If you have any questions, ask your nurse or doctor.               Start taking these medicines.        Dose/Directions    citalopram 20 MG tablet   Commonly known as:  celeXA   Used for:  ANGELINA (generalized anxiety disorder)   Started by:  Aym Jimenez APRN CNP        Take 1/2 tablet (10 mg) for 1-2 weeks, then increase to 1 tablet orally daily   Quantity:  30 tablet   Refills:  1            Where to get your medicines      These medications were sent to [facility]/pharmacy #[id number] [address] [address]     Phone:  [phone]     citalopram 20 MG tablet                Primary Care Provider Office Phone # Fax #    Joel Daniel Irwin Wegener, [phone]       [facility] [address] [place] [id number]        Thank you!     Thank you for choosing [facility]  for your care. Our goal is always to provide you with excellent care. Hearing back from our patients is one way we can continue to improve our services. Please take a few minutes to complete the written survey that you may receive in the mail after your visit with us. Thank you!             Your Updated Medication List - Protect others around you: Learn how to safely use, store and throw away your medicines at www.disposemymeds.org.          This list is accurate as of: 3/8/17 11:22 AM.  Always use your most recent med list.                   Brand Name Dispense Instructions for use    citalopram 20 MG tablet     celeXA    30 tablet    Take 1/2 tablet (10 mg) for 1-2 weeks, then increase to 1 tablet orally daily       FLUoxetine 20 MG capsule    PROzac    90 capsule    Take 1 capsule (20 mg) by mouth daily       FLUOXETINE HCL PO      Reported on 3/8/2017       LORazepam 0.5 MG tablet    ATIVAN    14 tablet    Take 1-2 tablets (0.5-1 mg) by mouth nightly as needed for anxiety       PARoxetine 40 MG tablet    PAXIL    30 tablet    Take by mouth every morning Reported on 3/8/2017

## 2017-03-08 NOTE — PROGRESS NOTES
SUBJECTIVE:                                                    Rosalio Odonnell is a 31 year old male who presents to clinic today for the following health issues:      Musculoskeletal problem/pain      Duration: few days    Description  Location: spasms mostly in right leg, some in left    Intensity:  No pain    Accompanying signs and symptoms: none    History  Previous similar problem: YES- similar issue in left elbow prior  Previous evaluation:  none    Precipitating or alleviating factors:  Trauma or overuse: YES- has been training for marathon, increased weight lifting and running  Aggravating factors include: none    Therapies tried and outcome: foam rolling, rest, increased potassium and magnesium in diet    Pt notes history of ANGELINA which is worsened by fear of this possibly being a serious problem       Leg twitching is triggering anxiety.    Twitching has improved today.  Did a lot of lifting Saturday.  Sunday ran 10-11 miles.  Monday and yesterday developed muscle twitching to both legs.  Now it is just in his right leg.  Only notices it at rest.  He can physically see muscle twitching.  No weakness, feels he could run 10 miles right now.  No weakness.    No vision changes, no balance or gait changes, no paresthesias.  No headaches.      Elbow symptoms have resolved.    Restarted fluoxetine 2/2016, feels this worsened anxiety, waking up with drenching night sweats.  Stopped taking the fluoxetine 2 weeks ago.  Has been on paxil in the past, thinks it didn't work well.      Patient Active Problem List   Diagnosis     Generalized anxiety disorder     CARDIOVASCULAR SCREENING; LDL GOAL LESS THAN 160     History reviewed. No pertinent past surgical history.    Social History   Substance Use Topics     Smoking status: Never Smoker     Smokeless tobacco: Never Used     Alcohol use Yes      Comment: rare     History reviewed. No pertinent family history.      Current Outpatient Prescriptions   Medication Sig  Dispense Refill     citalopram (CELEXA) 20 MG tablet Take 1/2 tablet (10 mg) for 1-2 weeks, then increase to 1 tablet orally daily 30 tablet 1     LORazepam (ATIVAN) 0.5 MG tablet Take 1-2 tablets (0.5-1 mg) by mouth nightly as needed for anxiety 14 tablet 0     FLUoxetine (PROZAC) 20 MG capsule Take 1 capsule (20 mg) by mouth daily (Patient not taking: Reported on 3/8/2017) 90 capsule 1     FLUOXETINE HCL PO Reported on 3/8/2017       PARoxetine (PAXIL) 40 MG tablet Take by mouth every morning Reported on 3/8/2017 30 tablet        ROS:  MSK, neuro, psych as above, otherwise negative       OBJECTIVE:                                                    BP (!) 154/92 (BP Location: Right arm, Patient Position: Chair, Cuff Size: Adult Regular)  Pulse 92  Temp 98.2  F (36.8  C) (Tympanic)  Resp 16  Wt 214 lb (97.1 kg)  SpO2 99%  BMI 29.85 kg/m2   GENERAL APPEARANCE: healthy, alert and no distress  EYES: Eyes grossly normal to inspection and conjunctivae and sclerae normal  RESP: respirations nonlabored   ORTHO: bilateral lower legs without ecchymosis, erythema, or edema  NEURO: Normal strength and tone, mentation intact, speech normal, DTR symmetrically normal in lower extremities, gait normal including heel/toe/tandem walking, cranial nerves 2-12 intact, Romberg negative and rapid alternating movements normal, normal strength throughout  PSYCH: mentation appears normal and affect normal/bright        ASSESSMENT/PLAN:                                                    (R25.3) Muscle twitching  (primary encounter diagnosis)  Comment: suspect related to increased activity/marathon training, symptoms already improving.   Plan: reassured, reviewed stretching routine in the morning, before bed, and before/after exercise     (F41.1) ANGELINA (generalized anxiety disorder)  Comment: symptoms worse on fluoxetine  Plan: citalopram (CELEXA) 20 MG tablet, MENTAL HEALTH        REFERRAL        Trail of celexa.  Discussed dosing, delayed  efficacy, and common side effects.  Also recommended establishing with therapist which he is interested in.  Follow up in 1 month for recheck        See Patient Instructions    Amy Jimenez, [facility]    Patient Instructions   1.  I think twitching is related to recent increase in exercise, make sure you stretch before and after workouts, in the morning, and before bed.    2.  For anxiety let's try a less activating SSRI called celexa.  Take 1-mg (1/2 tab) for 1 week then increase to a full tab.  It will take 4-6 weeks to see full effect.  Side effects include decreased sex drive, drowsiness, weight gain, insomnia, anxiety, dizziness, headache, and nausea.  Some of these get better after the first 2-4 weeks.  We may have to try several different medications before we find the one that's right for you.      3.  Follow up in 1 month for recheck    4.  Consider establishing with a counselor

## 2017-03-08 NOTE — PATIENT INSTRUCTIONS
1.  I think twitching is related to recent increase in exercise, make sure you stretch before and after workouts, in the morning, and before bed.    2.  For anxiety let's try a less activating SSRI called celexa.  Take 1-mg (1/2 tab) for 1 week then increase to a full tab.  It will take 4-6 weeks to see full effect.  Side effects include decreased sex drive, drowsiness, weight gain, insomnia, anxiety, dizziness, headache, and nausea.  Some of these get better after the first 2-4 weeks.  We may have to try several different medications before we find the one that's right for you.      3.  Follow up in 1 month for recheck    4.  Consider establishing with a counselor

## 2017-03-08 NOTE — NURSING NOTE
"Chief Complaint   Patient presents with     Musculoskeletal Problem       Initial BP (!) 154/92 (BP Location: Right arm, Patient Position: Chair, Cuff Size: Adult Regular)  Pulse 92  Temp 98.2  F (36.8  C) (Tympanic)  Resp 16  Wt 214 lb (97.1 kg)  SpO2 99%  BMI 29.85 kg/m2 Estimated body mass index is 29.85 kg/(m^2) as calculated from the following:    Height as of 2/23/17: 5' 11\" (1.803 m).    Weight as of this encounter: 214 lb (97.1 kg).  Medication Reconciliation: complete     Yoon Larkin, CANDIDO  "

## 2017-04-07 ENCOUNTER — OFFICE VISIT (OUTPATIENT)
Dept: FAMILY MEDICINE | Facility: CLINIC | Age: 32
End: 2017-04-07
Payer: COMMERCIAL

## 2017-04-07 VITALS
TEMPERATURE: 98.6 F | BODY MASS INDEX: 31.1 KG/M2 | SYSTOLIC BLOOD PRESSURE: 130 MMHG | HEART RATE: 96 BPM | WEIGHT: 223 LBS | DIASTOLIC BLOOD PRESSURE: 84 MMHG | OXYGEN SATURATION: 99 %

## 2017-04-07 DIAGNOSIS — F41.1 GAD (GENERALIZED ANXIETY DISORDER): ICD-10-CM

## 2017-04-07 PROCEDURE — 99213 OFFICE O/P EST LOW 20 MIN: CPT | Performed by: FAMILY MEDICINE

## 2017-04-07 RX ORDER — CITALOPRAM HYDROBROMIDE 20 MG/1
20 TABLET ORAL DAILY
Qty: 90 TABLET | Refills: 1 | Status: SHIPPED | OUTPATIENT
Start: 2017-04-07 | End: 2017-11-03

## 2017-04-07 ASSESSMENT — ANXIETY QUESTIONNAIRES
GAD7 TOTAL SCORE: 3
7. FEELING AFRAID AS IF SOMETHING AWFUL MIGHT HAPPEN: NOT AT ALL
5. BEING SO RESTLESS THAT IT IS HARD TO SIT STILL: SEVERAL DAYS
3. WORRYING TOO MUCH ABOUT DIFFERENT THINGS: SEVERAL DAYS
IF YOU CHECKED OFF ANY PROBLEMS ON THIS QUESTIONNAIRE, HOW DIFFICULT HAVE THESE PROBLEMS MADE IT FOR YOU TO DO YOUR WORK, TAKE CARE OF THINGS AT HOME, OR GET ALONG WITH OTHER PEOPLE: NOT DIFFICULT AT ALL
6. BECOMING EASILY ANNOYED OR IRRITABLE: NOT AT ALL
2. NOT BEING ABLE TO STOP OR CONTROL WORRYING: NOT AT ALL
1. FEELING NERVOUS, ANXIOUS, OR ON EDGE: SEVERAL DAYS

## 2017-04-07 ASSESSMENT — PATIENT HEALTH QUESTIONNAIRE - PHQ9: 5. POOR APPETITE OR OVEREATING: NOT AT ALL

## 2017-04-07 NOTE — NURSING NOTE
"Chief Complaint   Patient presents with     Anxiety     medication follow  up       Initial /84 (BP Location: Right arm, Patient Position: Chair, Cuff Size: Adult Regular)  Pulse 96  Temp 98.6  F (37  C) (Tympanic)  Wt 223 lb (101.2 kg)  SpO2 99%  BMI 31.1 kg/m2 Estimated body mass index is 31.1 kg/(m^2) as calculated from the following:    Height as of 2/23/17: 5' 11\" (1.803 m).    Weight as of this encounter: 223 lb (101.2 kg).  Medication Reconciliation: complete Soniya Dougherty MA      "

## 2017-04-07 NOTE — PROGRESS NOTES
SUBJECTIVE:                                                    Rosalio Odonnell is a 31 year old male who presents to clinic today for the following health issues:      Medication Followup of  celexa    Taking Medication as prescribed: yes    Side Effects:  None    Medication Helping Symptoms:  yes                  Additional history/life update:    ANGELINA (generalized anxiety disorder) :see plan for additional history    Medical, social, surgical, and family histories reviewed.      REVIEW OF SYSTEMS FOR GENERAL, RESPIRATORY, CV, GI AND PSYCHIATRIC NEGATIVE EXCEPT AS NOTED IN HPI.         OBJECTIVE:  /84 (BP Location: Right arm, Patient Position: Chair, Cuff Size: Adult Regular)  Pulse 96  Temp 98.6  F (37  C) (Tympanic)  Wt 223 lb (101.2 kg)  SpO2 99%  BMI 31.1 kg/m2    EXAM:  GENERAL APPEARANCE: healthy, alert and no distress  MENTAL STATUS EXAM  Appearance: appropriate  Attitude: cooperative  Behavior: normal  Eyre Contact: normal  Speech: normal  Orientation: oreinted to person , place, time and situation  Mood:  admits little sadness and anxiety most of time  Affect: Mood Congruient  Thought Process: clear  Suicidal Ideation: reports no thoughts, no intention  Hallucination: no      ASSESSMENT AND PLAN  Patient Instructions   ASSESSMENT AND PLAN  1. ANGELINA (generalized anxiety disorder)  Overall going well with the change to citalopram.      Elbow twitching seems to have gone away so could have been a fluoxetine side effect.     Continue 20mg daily.  If side effects we could try to change to lexapro.     Follow up six months can cancel July appointment.     - citalopram (CELEXA) 20 MG tablet; Take 1 tablet (20 mg) by mouth daily Profile Rx: patient will contact pharmacy when needed  Dispense: 90 tablet; Refill: 1        MYCHART SIGNUP FOR E-VISITS AND EASIER COMMUNICATION:  [email or web address]     Zipnosis:  [email or web address].  Sign up for e-visits for common illnesses!     RADIOLOGY:    [facility]:  [phone] to schedule any radiology tests at [facility]: [phone]    Mammograms/colonoscopies:  [phone]    CONSUMER PRICE LINE for estimates of test costs:  [phone]               Spent greater than 1/2 of 15 minutes discussing above assessment and plan.      Joel Wegener,MD

## 2017-04-07 NOTE — MR AVS SNAPSHOT
After Visit Summary   4/7/2017    Rosalio Odonnell    MRN: [id number]           Patient Information     Date Of Birth          1985        Visit Information        Provider Department      4/7/2017 11:40 AM Wegener, Joel Daniel Irwin, MD [facility]        Today's Diagnoses     ANGELINA (generalized anxiety disorder)          Care Instructions    ASSESSMENT AND PLAN  1. ANGELINA (generalized anxiety disorder)  Overall going well with the change to citalopram.      Elbow twitching seems to have gone away so could have been a fluoxetine side effect.     Continue 20mg daily.  If side effects we could try to change to lexapro.     Follow up six months can cancel July appointment.     - citalopram (CELEXA) 20 MG tablet; Take 1 tablet (20 mg) by mouth daily Profile Rx: patient will contact pharmacy when needed  Dispense: 90 tablet; Refill: 1        MYCHART SIGNUP FOR E-VISITS AND EASIER COMMUNICATION:  [email or web address]     Zipnosis:  [employer].  Sign up for e-visits for common illnesses!     RADIOLOGY:   [facility]:  [phone] to schedule any radiology tests at [facility]: [phone]    Mammograms/colonoscopies:  [phone]    CONSUMER PRICE LINE for estimates of test costs:  [phone]             Follow-ups after your visit        Your next 10 appointments already scheduled     Apr 13, 2017  2:00 PM CDT   New Visit with Ludmila Song [facility] ([facility]ld[facility]  [address]  [facility] [address] [phone]            Jul 10, 2017  2:00 PM CDT   Office Visit with Joel Daniel Irwin Wegener, MD   [facility] ([facility])    [address] [place] [address] [phone]           Bring a current list of meds and any records pertaining to this visit.  For Physicals, please bring immunization records and any forms  needing to be filled out.  Please arrive 10 minutes early to complete paperwork.              Who to contact     If you have questions or need follow up information about today's clinic visit or your schedule please contact [facility] directly at [phone].  Normal or non-critical lab and imaging results will be communicated to you by MyChart, letter or phone within 4 business days after the clinic has received the results. If you do not hear from us within 7 days, please contact the clinic through [employer] or phone. If you have a critical or abnormal lab result, we will notify you by phone as soon as possible.  Submit refill requests through [employer] or call your pharmacy and they will forward the refill request to us. Please allow 3 business days for your refill to be completed.          Additional Information About Your Visit        [employer] Information     [employer] gives you secure access to your electronic health record. If you see a primary care provider, you can also send messages to your care team and make appointments. If you have questions, please call your primary care clinic.  If you do not have a primary care provider, please call [phone] and they will assist you.        Care EveryWhere ID     This is your Care EveryWhere ID. This could be used by other organizations to access your [place] medical records  [id number]        Your Vitals Were     Pulse Temperature Pulse Oximetry BMI (Body Mass Index)          96 98.6  F (37  C) (Tympanic) 99% 31.1 kg/m2         Blood Pressure from Last 3 Encounters:   04/07/17 130/84   03/08/17 (!) 154/92   02/23/17 136/87    Weight from Last 3 Encounters:   04/07/17 223 lb (101.2 kg)   03/08/17 214 lb (97.1 kg)   02/23/17 210 lb (95.3 kg)              Today, you had the following     No orders found for display         Today's Medication Changes          These changes are accurate as of: 4/7/17 12:09 PM.  If you have any questions, ask your nurse or  doctor.               These medicines have changed or have updated prescriptions.        Dose/Directions    citalopram 20 MG tablet   Commonly known as:  celeXA   This may have changed:    - how much to take  - how to take this  - when to take this  - additional instructions   Used for:  ANGELINA (generalized anxiety disorder)   Changed by:  Wegener, Joel Daniel Irwin, MD        Dose:  20 mg   Take 1 tablet (20 mg) by mouth daily Profile Rx: patient will contact pharmacy when needed   Quantity:  90 tablet   Refills:  1         Stop taking these medicines if you haven't already. Please contact your care team if you have questions.     FLUoxetine 20 MG capsule   Commonly known as:  PROzac   Stopped by:  Wegener, Joel Daniel Irwin, MD           FLUOXETINE HCL PO   Stopped by:  Wegener, Joel Daniel Irwin, MD           PARoxetine 40 MG tablet   Commonly known as:  PAXIL   Stopped by:  Wegener, Joel Daniel Irwin, MD                Where to get your medicines      These medications were sent to [facility]/pharmacy #[id number] - [place] [id number] [address]     Phone:  [phone]     citalopram 20 MG tablet                Primary Care Provider Office Phone # Fax #    Joel Daniel Irwin Wegener, [phone]       [address] [address]        Thank you!     Thank you for choosing [facility]  for your care. Our goal is always to provide you with excellent care. Hearing back from our patients is one way we can continue to improve our services. Please take a few minutes to complete the written survey that you may receive in the mail after your visit with us. Thank you!             Your Updated Medication List - Protect others around you: Learn how to safely use, store and throw away your medicines at www.disposemymeds.org.          This list is accurate as of: 4/7/17 12:09 PM.  Always use your most recent med list.                    Brand Name Dispense Instructions for use    citalopram 20 MG tablet    celeXA    90 tablet    Take 1 tablet (20 mg) by mouth daily Profile Rx: patient will contact pharmacy when needed       LORazepam 0.5 MG tablet    ATIVAN    14 tablet    Take 1-2 tablets (0.5-1 mg) by mouth nightly as needed for anxiety

## 2017-04-07 NOTE — PATIENT INSTRUCTIONS
ASSESSMENT AND PLAN  1. ANGELNIA (generalized anxiety disorder)  Overall going well with the change to citalopram.      Elbow twitching seems to have gone away so could have been a fluoxetine side effect.     Continue 20mg daily.  If side effects we could try to change to lexapro.     Follow up six months can cancel July appointment.     - citalopram (CELEXA) 20 MG tablet; Take 1 tablet (20 mg) by mouth daily Profile Rx: patient will contact pharmacy when needed  Dispense: 90 tablet; Refill: 1        MYCHART SIGNUP FOR E-VISITS AND EASIER COMMUNICATION:  [email or web address]     Zipnosis:  [place].  Sign up for e-visits for common illnesses!     RADIOLOGY:   [facility]:  [phone] to schedule any radiology tests at [facility]: [phone]    Mammograms/colonoscopies:  [phone]    CONSUMER PRICE LINE for estimates of test costs:  [phone]

## 2017-04-08 ASSESSMENT — ANXIETY QUESTIONNAIRES: GAD7 TOTAL SCORE: 3

## 2017-04-08 ASSESSMENT — PATIENT HEALTH QUESTIONNAIRE - PHQ9: SUM OF ALL RESPONSES TO PHQ QUESTIONS 1-9: 2

## 2017-04-13 ENCOUNTER — OFFICE VISIT (OUTPATIENT)
Dept: PSYCHOLOGY | Facility: CLINIC | Age: 32
End: 2017-04-13
Attending: NURSE PRACTITIONER
Payer: COMMERCIAL

## 2017-04-13 DIAGNOSIS — F41.9 ANXIETY: Primary | ICD-10-CM

## 2017-04-13 PROCEDURE — 90834 PSYTX W PT 45 MINUTES: CPT | Performed by: COUNSELOR

## 2017-04-13 ASSESSMENT — ANXIETY QUESTIONNAIRES
GAD7 TOTAL SCORE: 4
5. BEING SO RESTLESS THAT IT IS HARD TO SIT STILL: SEVERAL DAYS
IF YOU CHECKED OFF ANY PROBLEMS ON THIS QUESTIONNAIRE, HOW DIFFICULT HAVE THESE PROBLEMS MADE IT FOR YOU TO DO YOUR WORK, TAKE CARE OF THINGS AT HOME, OR GET ALONG WITH OTHER PEOPLE: NOT DIFFICULT AT ALL
7. FEELING AFRAID AS IF SOMETHING AWFUL MIGHT HAPPEN: NOT AT ALL
6. BECOMING EASILY ANNOYED OR IRRITABLE: NOT AT ALL
2. NOT BEING ABLE TO STOP OR CONTROL WORRYING: SEVERAL DAYS
3. WORRYING TOO MUCH ABOUT DIFFERENT THINGS: SEVERAL DAYS
1. FEELING NERVOUS, ANXIOUS, OR ON EDGE: SEVERAL DAYS

## 2017-04-13 ASSESSMENT — PATIENT HEALTH QUESTIONNAIRE - PHQ9: 5. POOR APPETITE OR OVEREATING: NOT AT ALL

## 2017-04-13 NOTE — MR AVS SNAPSHOT
MRN:[id number]                      After Visit Summary   4/13/2017    Rosalio Odonnell    MRN: [id number]           Visit Information        Provider Department      4/13/2017 2:00 PM [facility]      Your next 10 appointments already scheduled     Apr 17, 2017  2:30 PM CDT   Return Visit with Lud[facility]ila [facility]   [facility] ([facility])    [place] Professional [facility]ldg  [address]  [facility] [id number] [phone]              MyChart Information     "[employer] gives you secure access to your electronic health record. If you see a primary care provider, you can also send messages to your care team and make appointments. If you have questions, please call your primary care clinic.  If you do not have a primary care provider, please call [phone] and they will assist you.        Care EveryWhere ID     This is your Care EveryWhere ID. This could be used by other organizations to access your [place] medical records  [id number]

## 2017-04-13 NOTE — Clinical Note
Hi Dr. Wegener, Patient completed first intake appt for therapy. He currently meets criteria for unspecified anxiety. Please contact me with any questions or concerns.   Thank you, Ludmila Song MA, [facility]

## 2017-04-13 NOTE — PROGRESS NOTES
Progress Note - Initial Session    Client Name:  Rosalio Odonnell Date: 4/13/2017         Service Type: Individual      Session Start Time: 2:00p  Session End Time: 2:45p      Session Length: 38 - 52      Session #: 1     Attendees: Client attended alone         Diagnostic Assessment in progress.  Unable to complete documentation at the conclusion of the first session due to assessing and addressing low scores on PHQ 9 and ANGELINA 7 and prompting client to identify mental health needs.      Mental Status Assessment:  Appearance:   Appropriate   Eye Contact:   Fair   Psychomotor Behavior: Normal   Attitude:   Cooperative   Orientation:   All  Speech   Rate / Production: Normal    Volume:  Normal   Mood:    Anxious  Normal  Affect:    Appropriate   Thought Content:  Clear   Thought Form:  Coherent  Goal Directed  Logical   Insight:    Fair       Safety Issues and Plan for Safety and Risk Management:  Client denies current fears or concerns for personal safety.  Client denies current or recent suicidal ideation or behaviors.  Client denies current or recent homicidal ideation or behaviors.  Client denies current or recent self injurious behavior or ideation.  Client denies other safety concerns.  A safety and risk management plan has not been developed at this time, however client was given the after-hours number / 911 should there be a change in any of these risk factors.  Client reports there are no firearms in the house.      Diagnostic Criteria:   - Excessive anxiety and worry about a number of events or activities (such as work or school performance).    - The person finds it difficult to control the worry.   - Restlessness or feeling keyed up or on edge.    - Muscle tension.       DSM5 Diagnoses: (Sustained by DSM5 Criteria Listed Above)  Diagnoses: 300.00 (F41.9) Unspecified Anxiety Disorder; History of depression per client report  Psychosocial & Contextual Factors: Work stress, interpersonal  difficulties, limited social support  WHODAS 2.0 (12 item)            This questionnaire asks about difficulties due to health conditions. Health conditions  include  disease or illnesses, other health problems that may be short or long lasting,  injuries, mental health or emotional problems, and problems with alcohol or drugs.                     Think back over the past 30 days and answer these questions, thinking about how much  difficulty you had doing the following activities. For each question, please [place] only  one response.    S1 Standing for long periods such as 30 minutes? None =         1   S2 Taking care of household responsibilities? None =         1   S3 Learning a new task, for example, learning how to get to a new place? None =         1   S4 How much of a problem do you have joining community activities (for example, festivals, Congregational or other activities) in the same way as anyone else can? None =         1   S5 How much have you been emotionally affected by your health problems? Mild =           2     In the past 30 days, how much difficulty did you have in:   S6 Concentrating on doing something for ten minutes? Mild =           2   S7 Walking a long distance such as a kilometer (or equivalent)? None =         1   S8 Washing your whole body? None =         1   S9 Getting dressed? None =         1   S10 Dealing with people you do not know? None =         1   S11 Maintaining a friendship? None =         1   S12 Your day to day work? None =         1     H1 Overall, in the past 30 days, how many days were these difficulties present? Record number of days 1-5   H2 In the past 30 days, for how many days were you totally unable to carry out your usual activities or work because of any health condition? Record number of days  0   H3 In the past 30 days, not counting the days that you were totally unable, for how many days did you cut back or reduce your usual activities or work because of any health  condition? Record number of days 1-2       Collateral Reports Completed:  Routed note to PCP      PLAN: (Homework, other):  Client stated that he may follow up for ongoing services with [facility].  Continue to assess and address mental health needs, complete DA, and start treatment plan.      Ludmila Song, [facility]

## 2017-04-14 ASSESSMENT — ANXIETY QUESTIONNAIRES: GAD7 TOTAL SCORE: 4

## 2017-04-14 ASSESSMENT — PATIENT HEALTH QUESTIONNAIRE - PHQ9: SUM OF ALL RESPONSES TO PHQ QUESTIONS 1-9: 2

## 2017-05-01 ENCOUNTER — OFFICE VISIT (OUTPATIENT)
Dept: PSYCHOLOGY | Facility: CLINIC | Age: 32
End: 2017-05-01
Payer: COMMERCIAL

## 2017-05-01 DIAGNOSIS — F41.9 ANXIETY: Primary | ICD-10-CM

## 2017-05-01 PROCEDURE — 90791 PSYCH DIAGNOSTIC EVALUATION: CPT | Performed by: COUNSELOR

## 2017-05-01 NOTE — PROGRESS NOTES
"                                                                                                                                                                      Adult Intake Structured Interview  Standard Diagnostic Assessment      CLIENT'S NAME: Rosalio Odonnell  MRN:   [id number]  :   1985  ACCT. NUMBER: [id number]  DATE OF SERVICE: 17      Identifying Information:  Client is a 31 year old, , single male. Client was referred for counseling by self. Client is currently employed full time. Client attended the session alone. Today client reported anxiety symptoms have decreased significantly and for the most part are nonexistent. He seemed ambivalent about how to move forward with therapy. This writer recommended a plan for client to schedule therapy appts as needed and will also check in via phone call in one month to see what his needs are and how he would like to move forward with therapy.       Client's Statement of Presenting Concern:  Client reports the reason for seeking therapy at this time per intake form as \"My anxiety, which has been bad for many years, has recently spiked to a level in which I've never experienced\".  Client stated that his symptoms have resulted in the following functional impairments: management of the household and or completion of tasks, relationship(s), self-care, social interactions and work / vocational responsibilities      History of Presenting Concern:  Client reports that these problem(s) began since he was younger, but has gotten worsen in the past few month since around 2017. Client states that in combination to anxiety building and work stress, he experienced left arm weakness and googled symptom. Reported experiencing first panic attack due to learning online that symptom could be a result of ALS. 1 month later, his left arm was twitching after weight lifting and again he reported feeling afraid that symptom may be due to ALS. On another occasion, he " "experienced leg twitching after running and became anxious thinking symptom may be related to ALS. On all 3 occasions, client reported getting checked out and for the most part, doctors were not concerned. Client is unsure why he is fixated and anxious about becoming ill with ALS. Reported that absence of physical symptoms/sensations has decreased anxiety in the past month. Client has attempted to resolve these concerns in the past through new medication, breathing, long runs, using whirl pool at gym, walking, and coloring. Client reports that other professional(s) are involved in providing support / services - PCP prescribed medications.      Social History:  Client reported he grew up in [place], [place]. Has been living in [place] for the past 5.5 years for work. They were the second born of 2 children. This is an intact family and parents remain . Client reported that his childhood was \"pretty good and normal for the most part\". Client described his current relationships with family of origin as \"occasional conversations with parents, not much conversation with sister\".    Client reported a history of 0 committed relationships or marriages. Client has been single all his life. Client reported having 0 children. Client identified few stable and meaningful social connections. Client reported that he has not been involved with the legal system. Client's highest education level was college graduate. Client did not identify any learning problems. There are no ethnic, cultural or Anglican factors that may be relevant for therapy. Client identified his preferred language to be English. Client reported he does not need the assistance of an  or other support involved in therapy. Modifications will not be used to assist communication in therapy. Client did not serve in the .     Client reports family history is not on file.      Mental Health History:  Client reported no family history of mental " health issues.  Client previously received the following mental health diagnosis: ADHD, Anxiety and Depression.  Client has received the following mental health services in the past: counseling, medication(s) from physician / PCP and psychiatry.  Hospitalizations: None.  Client is currently receiving the following services: medication(s) from physician / PCP.      Chemical Health History:  Client reported no family history of chemical health issues. Client has not received chemical dependency treatment in the past. Client is not currently receiving any chemical dependency treatment. Client reports no problems as a result of their drinking / drug use.      Client Reports:  Client reports using alcohol 1-2 times per month and has a few drinks at a time. Client first started drinking at age 23.  Client denies using tobacco.  Client denies using marijuana.  Client reports using caffeine 1-2 times per day and drinks 1-2 cups of coffee at a time. Client started using caffeine at age 22.  Client denies using street drugs.  Client denies the non-medical use of prescription or over the counter drugs.    CAGE: None of the patient's responses to the CAGE screening were positive / Negative CAGE score   Based on the negative Cage-Aid score and clinical interview there are not indications of drug or alcohol abuse.    Discussed the general effects of drugs and alcohol on health and well-being. Therapist gave client printed information about the effects of chemical use on his health and well being.      Significant Losses / Trauma / Abuse / Neglect Issues:  There are no indications or report of: significant losses, trauma, abuse or neglect.    Issues of possible neglect are not present.      Medical Issues:  Client has had a physical exam to rule out medical causes for current symptoms. Date of last physical exam was within the past year. Client was encouraged to follow up with PCP if symptoms were to develop. The client has a  "[facility] Provider, who is named Wegener, Joel Daniel Irwin. The client reports not having a psychiatrist. Client reports no current medical concerns. The client denies the presence of chronic or episodic pain. There are not significant nutritional concerns. \"I do worry my prescribed SSRI (which weight gain is a side effect) will produce depression.\"    Client reports current meds as:   Current Outpatient Prescriptions   Medication Sig     citalopram (CELEXA) 20 MG tablet Take 1 tablet (20 mg) by mouth daily Profile Rx: patient will contact pharmacy when needed     LORazepam (ATIVAN) 0.5 MG tablet Take 1-2 tablets (0.5-1 mg) by mouth nightly as needed for anxiety     No current facility-administered medications for this visit.        Client Allergies:  Allergies   Allergen Reactions     Amoxicillin Rash     no allergies to medications    Medical History:  No past medical history on file.      Medication Adherence:  Client reports taking prescribed medications as prescribed.    Client was provided recommendation to follow-up with prescribing physician.      Mental Status Assessment:  Appearance:   Appropriate   Eye Contact:   Good   Psychomotor Behavior: Normal   Attitude:   Cooperative   Orientation:   All  Speech   Rate / Production: Normal    Volume:  Normal   Mood:    Normal  Affect:    Appropriate   Thought Content:  Clear   Thought Form:  Coherent  Goal Directed  Logical   Insight:    Fair       Review of Symptoms:  Depression: Guilt Psychomotor slowing or agitation  Erin:  No symptoms  Psychosis: No symptoms  Anxiety: Worries Nervousness  Panic:  Feeling warm, flush, panicky, body twitching, throat tightening  Post Traumatic Stress Disorder: No symptoms  Obsessive Compulsive Disorder: No symptoms  Eating Disorder: No symptoms  Oppositional Defiant Disorder: No symptoms  ADD / ADHD: Attention Task Completion Organization  Conduct Disorder: No symptoms      Safety Issues and Plan for Safety and Risk " Management:  Client denies a history of suicidal ideation, suicide attempts, self-injurious behavior, homicidal ideation, homicidal behavior and and other safety concerns  Client denies current fears or concerns for personal safety.  Client denies current or recent suicidal ideation or behaviors.  Client denies current or recent homicidal ideation or behaviors.  Client denies current or recent self injurious behavior or ideation.  Client denies other safety concerns.  Client reports there are no firearms in the house.  A safety and risk management plan has not been developed at this time, however client was given the after-hours number / 911 should there be a change in any of these risk factors.      Client's Strengths and Limitations:  Client identified the following strengths or resources that will help him succeed in counseling: commitment to health and well being, openness to new ideas, and time. Client identified the following supports: friends. Things that may interfere with the clients success in counseling include: logistics with work, co-pay/insurance, isolation.      Diagnostic Criteria:  - Excessive anxiety and worry about a number of events or activities (such as work or school performance).   - The person finds it difficult to control the worry.  - Restlessness or feeling keyed up or on edge.   - Muscle tension.       Functional Status:  Client's symptoms have caused reduced functional status in the following areas: Activities of Daily Living, Occupational / Vocational, and Social / Relational.      DSM5 Diagnoses: (Sustained by DSM5 Criteria Listed Above)  Diagnoses: 300.00 (F41.9) Unspecified Anxiety Disorder; History of depression per client report  Psychosocial & Contextual Factors: Work stress, interpersonal difficulties, limited social support  WHODAS 2.0 (12 item)            This questionnaire asks about difficulties due to health conditions. Health conditions  include  disease or illnesses,  other health problems that may be short or long lasting,  injuries, mental health or emotional problems, and problems with alcohol or drugs.                     Think back over the past 30 days and answer these questions, thinking about how much  difficulty you had doing the following activities. For each question, please [place] only  one response.    S1 Standing for long periods such as 30 minutes? None =         1   S2 Taking care of household responsibilities? None =         1   S3 Learning a new task, for example, learning how to get to a new place? None =         1   S4 How much of a problem do you have joining community activities (for example, festivals, Taoism or other activities) in the same way as anyone else can? None =         1   S5 How much have you been emotionally affected by your health problems? Mild =           2     In the past 30 days, how much difficulty did you have in:   S6 Concentrating on doing something for ten minutes? Mild =           2   S7 Walking a long distance such as a kilometer (or equivalent)? None =         1   S8 Washing your whole body? None =         1   S9 Getting dressed? None =         1   S10 Dealing with people you do not know? None =         1   S11 Maintaining a friendship? None =         1   S12 Your day to day work? None =         1     H1 Overall, in the past 30 days, how many days were these difficulties present? Record number of days 1-5   H2 In the past 30 days, for how many days were you totally unable to carry out your usual activities or work because of any health condition? Record number of days  0   H3 In the past 30 days, not counting the days that you were totally unable, for how many days did you cut back or reduce your usual activities or work because of any health condition? Record number of days 1-2     Attendance Agreement:  Client has signed Attendance Agreement:Yes      Preliminary Treatment Plan:  The client reports no currently identified  Muslim, ethnic or cultural issues relevant to therapy.     services are not indicated.    Modifications to assist communication are not indicated.    The concerns identified by the client will be addressed in therapy.    Initial Treatment will focus on: Anxiety.    As a preliminary treatment goal, client will experience a reduction in anxiety, will develop more effective coping skills to manage anxiety symptoms, will develop healthy cognitive patterns and beliefs and will increase ability to function adaptively.    The focus of initial interventions will be to alleviate anxiety, facilitate appropriate expression of feelings, increase ability to function adaptively, increase coping skills, increase self esteem, provide homework to reinforce skill development, reduce panic attacks, teach CBT skills, teach communication skills, teach conflict management skills, teach DBT skills, teach distress tolerance skills, teach mindfulness skills, teach problem-solving skills, teach relaxation strategies, teach social skills and teach stress mangement techniques.    Collaboration with other professionals is not indicated at this time.    Referral to another professional/service is not indicated at this time.    A Release of Information is not needed at this time.    Report to child / adult protection services was NA.    Client will have access to their [facility]' medical record.    Ludmila Song [facility]  May 5, 2017

## 2017-05-01 NOTE — Clinical Note
Hi all, Patient completed intake for therapy. Today client reported anxiety symptoms have decreased significantly and for the most part are nonexistent. He seemed ambivalent about how to move forward with therapy. I recommended a plan for client to schedule therapy appts as needed and I will also check in via phone call in one month to see what his needs are and how he would like to move forward with therapy. Please contact me with any questions or concerns.   Thank you, Ludmila Song MA, [facility]

## 2017-05-01 NOTE — MR AVS SNAPSHOT
MRN:[id number]                      After Visit Summary   5/1/2017    Rosalio Odonnell    MRN: [id number]           Visit Information        Provider Department      5/1/2017 4:30 PM Ludmila Song, [facility]      MyChart Information     MyChart gives you secure access to your electronic health record. If you see a primary care provider, you can also send messages to your care team and make appointments. If you have questions, please call your primary care clinic.  If you do not have a primary care provider, please call [phone] and they will assist you.        Care EveryWhere ID     This is your Care EveryWhere ID. This could be used by other organizations to access your [facility] medical records  [id number]

## 2017-05-10 DIAGNOSIS — F41.1 GAD (GENERALIZED ANXIETY DISORDER): ICD-10-CM

## 2017-05-10 RX ORDER — CITALOPRAM HYDROBROMIDE 20 MG/1
20 TABLET ORAL DAILY
Qty: 90 TABLET | Refills: 1 | Status: CANCELLED | OUTPATIENT
Start: 2017-05-10

## 2017-05-10 NOTE — TELEPHONE ENCOUNTER
Medication Detail      Disp Refills Start End CHRIS   citalopram (CELEXA) 20 MG tablet 90 tablet 1 4/7/2017  No   Sig: Take 1 tablet (20 mg) by mouth daily Profile Rx: patient will contact pharmacy when needed       Last Office Visit with [facility] primary care provider:  4/7/17        Last PHQ-9 score on record=   PHQ-9 SCORE 4/13/2017   Total Score -   Total Score 2

## 2017-05-11 RX ORDER — CITALOPRAM HYDROBROMIDE 20 MG/1
20 TABLET ORAL DAILY
Qty: 90 TABLET | Refills: 1 | OUTPATIENT
Start: 2017-05-11

## 2017-06-01 ENCOUNTER — TELEPHONE (OUTPATIENT)
Dept: PSYCHOLOGY | Facility: CLINIC | Age: 32
End: 2017-06-01

## 2017-06-05 ENCOUNTER — FCC EXTENDED DOCUMENTATION (OUTPATIENT)
Dept: PSYCHOLOGY | Facility: CLINIC | Age: 32
End: 2017-06-05

## 2017-06-05 NOTE — LETTER
6/5/2017        Rosalio Odonnell  [address] [place] [address] [address]  [facility] [id number]      Hi Rosalio,   I hope this letter finds you well. It has been a while since your last date of service at [facility], 5/1/17. At our last appointment, you reported a decrease in anxiety symptoms and declined scheduling a therapy appointment. Instead, you agreed to a month phone check in. I called you on 6/1/17, but was unsuccessful in reaching you. In the future, should you desire to seek services again through our clinic, please do not hesitate to call us.    Sincerely,        Ludmila Song MA, [facility]

## 2017-06-19 NOTE — PROGRESS NOTES
"                    Discharge Summary  Multiple Sessions    Client Name: Rosalio Odonnell MRN#: [id number] YOB: 1985      Intake / Discharge Date: 6/19/2017      DSM5 Diagnoses: (Sustained by DSM5 Criteria Listed Above)  Diagnoses: 300.00 (F41.9) Unspecified Anxiety Disorder; History of depression per client report  Psychosocial & Contextual Factors: Work stress, interpersonal difficulties, limited social support  WHODAS 2.0 (12 item) Score: 14          Presenting Concern:  Client reports the reason for seeking therapy at this time per intake form as \"My anxiety, which has been bad for many years, has recently spiked to a level in which I've never experienced\". Client reports that these problem(s) began since he was younger, but has gotten worsen in the past few month since around Jan 2017. Client states that in combination to anxiety building and work stress, he experienced left arm weakness and googled symptom. Reported experiencing first panic attack due to learning online that symptom could be a result of ALS. 1 month later, his left arm was twitching after weight lifting and again he reported feeling afraid that symptom may be due to ALS. On another occasion, he experienced leg twitching after running and became anxious thinking symptom may be related to ALS. On all 3 occasions, client reported getting checked out and for the most part, doctors were not concerned. Client is unsure why he is fixated and anxious about becoming ill with ALS.         Reason for Discharge:  Client is satisfied with progress and client did not return. Reported that absence of physical symptoms/sensations has decreased anxiety in the past month.      Disposition at Time of Last Encounter:   Comments:   Stable, decreased anxiety, ambivalent about continuing therapy.     Risk Management:   Client denies a history of suicidal ideation, suicide attempts, self-injurious behavior, homicidal ideation, homicidal behavior and and other " safety concerns  A safety and risk management plan has not been developed at this time, however client was given the after-hours number / 911 should there be a change in any of these risk factors.      Referred To:  PCP        Ludmila Song [facility]   6/19/2017

## 2017-11-03 DIAGNOSIS — F41.1 GAD (GENERALIZED ANXIETY DISORDER): ICD-10-CM

## 2017-11-06 RX ORDER — CITALOPRAM HYDROBROMIDE 20 MG/1
20 TABLET ORAL DAILY
Qty: 90 TABLET | Refills: 0 | Status: SHIPPED | OUTPATIENT
Start: 2017-11-06 | End: 2018-02-10

## 2018-02-10 ENCOUNTER — TELEPHONE (OUTPATIENT)
Dept: FAMILY MEDICINE | Facility: CLINIC | Age: 33
End: 2018-02-10

## 2018-02-10 DIAGNOSIS — F41.1 GAD (GENERALIZED ANXIETY DISORDER): ICD-10-CM

## 2018-02-12 NOTE — TELEPHONE ENCOUNTER
"Requested Prescriptions   Pending Prescriptions Disp Refills     citalopram (CELEXA) 20 MG tablet [Pharmacy Med Name: CITALOPRAM HBR 20 MG TABLET]  Last Written Prescription Date:  11/6/2017  Last Fill Quantity: 90 tablet,  # refills: 0   Last Office Visit: 4/7/2017   Future Office Visit:      90 tablet 0     Sig: TAKE 1 TABLET (20 MG) BY MOUTH DAILY    SSRIs Protocol Passed    2/10/2018  2:05 AM       Passed - Recent or future visit with authorizing provider    Patient had office visit in the last year or has a visit in the next 30 days with authorizing provider.  See \"Patient Info\" tab in inbasket, or \"Choose Columns\" in Meds & Orders section of the refill encounter.          Passed - Patient is age 18 or older          "

## 2018-02-15 RX ORDER — CITALOPRAM HYDROBROMIDE 20 MG/1
TABLET ORAL
Qty: 30 TABLET | Refills: 0 | Status: SHIPPED | OUTPATIENT
Start: 2018-02-15 | End: 2018-02-20

## 2018-02-15 NOTE — TELEPHONE ENCOUNTER
Medication prescribed for anxiety.    Patient overdue for 6 month anxiety follow up appt, per 4/7/17 office visit note.    One month supply ordered.    Team coordinators-Please contact patient to schedule.    Thank you!  ELIZABETH DiazN, RN

## 2018-02-20 ENCOUNTER — OFFICE VISIT (OUTPATIENT)
Dept: FAMILY MEDICINE | Facility: CLINIC | Age: 33
End: 2018-02-20
Payer: COMMERCIAL

## 2018-02-20 VITALS
SYSTOLIC BLOOD PRESSURE: 139 MMHG | HEART RATE: 71 BPM | BODY MASS INDEX: 31.78 KG/M2 | OXYGEN SATURATION: 99 % | DIASTOLIC BLOOD PRESSURE: 78 MMHG | WEIGHT: 227 LBS | HEIGHT: 71 IN | RESPIRATION RATE: 18 BRPM

## 2018-02-20 DIAGNOSIS — F41.1 GAD (GENERALIZED ANXIETY DISORDER): Primary | ICD-10-CM

## 2018-02-20 DIAGNOSIS — F90.0 ATTENTION DEFICIT HYPERACTIVITY DISORDER (ADHD), PREDOMINANTLY INATTENTIVE TYPE: ICD-10-CM

## 2018-02-20 PROCEDURE — 99214 OFFICE O/P EST MOD 30 MIN: CPT | Performed by: FAMILY MEDICINE

## 2018-02-20 RX ORDER — ATOMOXETINE 40 MG/1
40 CAPSULE ORAL DAILY
Qty: 14 CAPSULE | Refills: 0 | Status: SHIPPED | OUTPATIENT
Start: 2018-02-20 | End: 2018-09-25

## 2018-02-20 RX ORDER — ATOMOXETINE 40 MG/1
40 CAPSULE ORAL DAILY
Qty: 14 CAPSULE | Refills: 0 | Status: SHIPPED | OUTPATIENT
Start: 2018-02-20 | End: 2018-02-20

## 2018-02-20 RX ORDER — ATOMOXETINE 80 MG/1
80 CAPSULE ORAL DAILY
Qty: 90 CAPSULE | Refills: 3 | Status: SHIPPED | OUTPATIENT
Start: 2018-02-20 | End: 2018-09-25

## 2018-02-20 RX ORDER — ATOMOXETINE 80 MG/1
80 CAPSULE ORAL DAILY
Qty: 90 CAPSULE | Refills: 3 | Status: SHIPPED | OUTPATIENT
Start: 2018-02-20 | End: 2018-02-20

## 2018-02-20 RX ORDER — CITALOPRAM HYDROBROMIDE 20 MG/1
20 TABLET ORAL DAILY
Qty: 90 TABLET | Refills: 3 | Status: SHIPPED | OUTPATIENT
Start: 2018-02-20 | End: 2018-02-20

## 2018-02-20 ASSESSMENT — ANXIETY QUESTIONNAIRES
2. NOT BEING ABLE TO STOP OR CONTROL WORRYING: SEVERAL DAYS
6. BECOMING EASILY ANNOYED OR IRRITABLE: NOT AT ALL
3. WORRYING TOO MUCH ABOUT DIFFERENT THINGS: SEVERAL DAYS
GAD7 TOTAL SCORE: 4
7. FEELING AFRAID AS IF SOMETHING AWFUL MIGHT HAPPEN: SEVERAL DAYS
1. FEELING NERVOUS, ANXIOUS, OR ON EDGE: SEVERAL DAYS
5. BEING SO RESTLESS THAT IT IS HARD TO SIT STILL: NOT AT ALL
IF YOU CHECKED OFF ANY PROBLEMS ON THIS QUESTIONNAIRE, HOW DIFFICULT HAVE THESE PROBLEMS MADE IT FOR YOU TO DO YOUR WORK, TAKE CARE OF THINGS AT HOME, OR GET ALONG WITH OTHER PEOPLE: NOT DIFFICULT AT ALL

## 2018-02-20 ASSESSMENT — PATIENT HEALTH QUESTIONNAIRE - PHQ9: 5. POOR APPETITE OR OVEREATING: NOT AT ALL

## 2018-02-20 NOTE — PATIENT INSTRUCTIONS
ASSESSMENT AND PLAN  1. ANGELINA (generalized anxiety disorder)  Trial changing to straterra to treat both anxiety and adhd .     Current citalopran 20mg daily.      Stop citalopram.   Start strattera 40mg daily for 1-2 weeks then increase to 80mg daily.     Follow up with me 2-3 months if needing to make further changes or one year at latest.   Consider vitamin D up to 3, 000 units daily and check level in 2-3 months.         - atomoxetine (STRATTERA) 80 MG capsule; Take 1 capsule (80 mg) by mouth daily  Dispense: 90 capsule; Refill: 3  - atomoxetine (STRATTERA) 40 MG capsule; Take 1 capsule (40 mg) by mouth daily  Dispense: 14 capsule; Refill: 0    2. Attention deficit hyperactivity disorder (ADHD), predominantly inattentive type    - atomoxetine (STRATTERA) 80 MG capsule; Take 1 capsule (80 mg) by mouth daily  Dispense: 90 capsule; Refill: 3  - atomoxetine (STRATTERA) 40 MG capsule; Take 1 capsule (40 mg) by mouth daily  Dispense: 14 capsule; Refill: 0        [facility] SIGNUP FOR E-VISITS AND EASIER COMMUNICATION:  [email or web address]     Zipnosis:  [place].  Sign up for e-visits for common illnesses!     RADIOLOGY:   [facility]:  [phone] to schedule any radiology tests at [facility]: [phone]    Mammograms/colonoscopies:  [phone]    CONSUMER PRICE LINE for estimates of test costs:  [phone]

## 2018-02-20 NOTE — PROGRESS NOTES
SUBJECTIVE:   Rosalio Odonnell is a 32 year old male who presents to clinic today for the following health issues:    Anxiety Follow-Up    Status since last visit: it comes and goes    Other associated symptoms:None    Complicating factors:   Significant life event: No   Current substance abuse: None  Depression symptoms: No  ANGELINA-7 SCORE 1/23/2017 4/7/2017 4/13/2017   Total Score 17 3 4       ANGELINA-7    Problems taking medications regularly: Yes,  Forget to take it sometimes    Medication side effects: rosy monteiro      Has not been taking citalopram regularly but has been taking since January again for what he thinks is anxiety with a seasonal component. He says his anxiety is a 3-4/10 now instead of about an 8-9/10 previously. Concern today is more with his attention. Previously diagnosed with ADHD by outside psychiatrist, was taking stimulant until about 2 years ago when no longer covered by insurance. Now feels like his main problem is motivation, difficulty concentrating when he is faced with a new task, and doing multiple chores at once when he's cleaning at home.    Additional history/life update: Recently travelled on vacation to [place].       ANGELINA (generalized anxiety disorder)  Attention deficit hyperactivity disorder (ADHD), predominantly inattentive type :        Problem list, Medication list, Allergies, and Medical/Social/Surgical histories reviewed in [facility] and updated as appropriate.  Labs reviewed in [facility]  BP Readings from Last 3 Encounters:   02/20/18 139/78   04/07/17 130/84   03/08/17 (!) 154/92    Wt Readings from Last 3 Encounters:   02/20/18 227 lb (103 kg)   04/07/17 223 lb (101.2 kg)   03/08/17 214 lb (97.1 kg)                  Patient Active Problem List   Diagnosis     Generalized anxiety disorder     CARDIOVASCULAR SCREENING; LDL GOAL LESS THAN 160     History reviewed. No pertinent surgical history.    Social History   Substance Use Topics     Smoking status: Never Smoker     Smokeless  "tobacco: Never Used     Alcohol use Yes      Comment: rare     History reviewed. No pertinent family history.      Current Outpatient Prescriptions   Medication Sig Dispense Refill     atomoxetine (STRATTERA) 80 MG capsule Take 1 capsule (80 mg) by mouth daily 90 capsule 3     atomoxetine (STRATTERA) 40 MG capsule Take 1 capsule (40 mg) by mouth daily 14 capsule 0     Allergies   Allergen Reactions     Amoxicillin Rash     Recent Labs   Lab Test  01/23/17   1649  11/11/15   1311  02/10/14   1514   LDL   --   69  79   HDL   --   60  59   TRIG   --   106  53   TSH  2.21   --   2.27        ROS:  Constitutional, HEENT, cardiovascular, pulmonary, GI, , musculoskeletal, neuro, skin, endocrine and psych systems are negative, except as otherwise noted.        OBJECTIVE:  /78  Pulse 71  Resp 18  Ht 5' 10.5\" (1.791 m)  Wt 227 lb (103 kg)  SpO2 99%  BMI 32.11 kg/m2    EXAM:  GENERAL APPEARANCE: healthy, alert and no distress  RESP: breathing comfortably  PSYCH: insight and judgement appear good, thought content is appropriate and thought processes logical, makes appropriate eye contact    ASSESSMENT AND PLAN  Patient Instructions   ASSESSMENT AND PLAN  1. ANGELINA (generalized anxiety disorder)/adhd  Trial changing to straterra to treat both anxiety and adhd .     Current citalopran 20mg daily.      Stop citalopram.   Start strattera 40mg daily for 1-2 weeks then increase to 80mg daily.     Follow up with me 2-3 months if needing to make further changes or one year at latest.   Consider vitamin D up to 3, 000 units daily and check level in 2-3 months.         - atomoxetine (STRATTERA) 80 MG capsule; Take 1 capsule (80 mg) by mouth daily  Dispense: 90 capsule; Refill: 3  - atomoxetine (STRATTERA) 40 MG capsule; Take 1 capsule (40 mg) by mouth daily  Dispense: 14 capsule; Refill: 0    2. Attention deficit hyperactivity disorder (ADHD), predominantly inattentive type    - atomoxetine (STRATTERA) 80 MG capsule; Take 1 " capsule (80 mg) by mouth daily  Dispense: 90 capsule; Refill: 3  - atomoxetine (STRATTERA) 40 MG capsule; Take 1 capsule (40 mg) by mouth daily  Dispense: 14 capsule; Refill: 0        MYCHART SIGNUP FOR E-VISITS AND EASIER COMMUNICATION:  [email or web address]     Zipnosis:  [email or web address].  Sign up for e-visits for common illnesses!     RADIOLOGY:   [facility]:  [phone] to schedule any radiology tests at [facility]: [phone]    Mammograms/colonoscopies:  [phone]    CONSUMER PRICE LINE for estimates of test costs:  [phone]              Scribed by Kapil Johansen, Medical Student for Joel Wegener, MD based on the provider s statements to me.        I have reviewed and edited the medical student s documentation acting as scribe and verify that the history, exam and medical decision making reflect the history, exam and decision making performed by myself today.

## 2018-02-20 NOTE — MR AVS SNAPSHOT
After Visit Summary   2/20/2018    Rosalio Odonnell    MRN: [id number]           Patient Information     Date Of Birth          1985        Visit Information        Provider Department      2/20/2018 10:20 AM Wegener, Joel Daniel Irwin, MD [facility]        Today's Diagnoses     ANGELINA (generalized anxiety disorder)    -  1    Attention deficit hyperactivity disorder (ADHD), predominantly inattentive type          Care Instructions    ASSESSMENT AND PLAN  1. ANGELINA (generalized anxiety disorder)  Trial changing to straterra to treat both anxiety and adhd .     Current citalopran 20mg daily.      Stop citalopram.   Start strattera 40mg daily for 1-2 weeks then increase to 80mg daily.     Follow up with me 2-3 months if needing to make further changes or one year at latest.   Consider vitamin D up to 3, 000 units daily and check level in 2-3 months.         - atomoxetine (STRATTERA) 80 MG capsule; Take 1 capsule (80 mg) by mouth daily  Dispense: 90 capsule; Refill: 3  - atomoxetine (STRATTERA) 40 MG capsule; Take 1 capsule (40 mg) by mouth daily  Dispense: 14 capsule; Refill: 0    2. Attention deficit hyperactivity disorder (ADHD), predominantly inattentive type    - atomoxetine (STRATTERA) 80 MG capsule; Take 1 capsule (80 mg) by mouth daily  Dispense: 90 capsule; Refill: 3  - atomoxetine (STRATTERA) 40 MG capsule; Take 1 capsule (40 mg) by mouth daily  Dispense: 14 capsule; Refill: 0        MYCHART SIGNUP FOR E-VISITS AND EASIER COMMUNICATION:  [email or web address]     Zipnosis:  [email or web address].  Sign up for e-visits for common illnesses!     RADIOLOGY:   [facility]:  [phone] to schedule any radiology tests at [facility]: [phone]    Mammograms/colonoscopies:  [phone]    CONSUMER PRICE LINE for estimates of test costs:  [phone]               Follow-ups after your visit        Who to contact     If you have questions or need follow up  "information about today's clinic visit or your schedule please contact [facility] directly at [phone].  Normal or non-critical lab and imaging results will be communicated to you by [employer], letter or phone within 4 business days after the clinic has received the results. If you do not hear from us within 7 days, please contact the clinic through [employer] or phone. If you have a critical or abnormal lab result, we will notify you by phone as soon as possible.  Submit refill requests through [employer] or call your pharmacy and they will forward the refill request to us. Please allow 3 business days for your refill to be completed.          Additional Information About Your Visit        [employer] Information     [employer] gives you secure access to your electronic health record. If you see a primary care provider, you can also send messages to your care team and make appointments. If you have questions, please call your primary care clinic.  If you do not have a primary care provider, please call [phone] and they will assist you.        Care EveryWhere ID     This is your Care EveryWhere ID. This could be used by other organizations to access your [place] medical records  [id number]        Your Vitals Were     Pulse Respirations Height Pulse Oximetry BMI (Body Mass Index)       71 18 5' 10.5\" (1.791 m) 99% 32.11 kg/m2        Blood Pressure from Last 3 Encounters:   02/20/18 139/78   04/07/17 130/84   03/08/17 (!) 154/92    Weight from Last 3 Encounters:   02/20/18 227 lb (103 kg)   04/07/17 223 lb (101.2 kg)   03/08/17 214 lb (97.1 kg)              Today, you had the following     No orders found for display         Today's Medication Changes          These changes are accurate as of 2/20/18 11:15 AM.  If you have any questions, ask your nurse or doctor.               Start taking these medicines.        Dose/Directions    * atomoxetine 80 MG capsule   Commonly known as:  STRATTERA   Used for:  " ANGELINA (generalized anxiety disorder), Attention deficit hyperactivity disorder (ADHD), predominantly inattentive type   Started by:  Wegener, Joel Daniel Irwin, MD        Dose:  80 mg   Take 1 capsule (80 mg) by mouth daily   Quantity:  90 capsule   Refills:  3       * atomoxetine 40 MG capsule   Commonly known as:  STRATTERA   Used for:  ANGELINA (generalized anxiety disorder), Attention deficit hyperactivity disorder (ADHD), predominantly inattentive type   Started by:  Wegener, Joel Daniel Irwin, MD        Dose:  40 mg   Take 1 capsule (40 mg) by mouth daily   Quantity:  14 capsule   Refills:  0       * Notice:  This list has 2 medication(s) that are the same as other medications prescribed for you. Read the directions carefully, and ask your doctor or other care provider to review them with you.      Stop taking these medicines if you haven't already. Please contact your care team if you have questions.     citalopram 20 MG tablet   Commonly known as:  celeXA   Stopped by:  Wegener, Joel Daniel Irwin, MD                Where to get your medicines      These medications were sent to [facility] [address]  [address] [facility] [address]     Phone:  [phone]     atomoxetine 40 MG capsule    atomoxetine 80 MG capsule                Primary Care Provider Office Phone # Fax #    Joel Daniel Irwin Wegener, [phone]       [address]  [facility] [address]        Equal Access to Services     [facility]: Hadii aad ku hadasho Soomaali, waaxda luqadaha, qaybta kaalmada adeegyada, yue crane haylenora ward . So [facility] [phone].    ATENCIÓN: Si habla español, tiene a ivory disposición servicios gratuitos de asistencia lingüística. Llame al [phone].    We comply with applicable federal civil rights laws and [place] laws. We do not discriminate on the basis of race, color, national origin, age, disability, sex, sexual orientation, or  gender identity.            Thank you!     Thank you for choosing [facility]  for your care. Our goal is always to provide you with excellent care. Hearing back from our patients is one way we can continue to improve our services. Please take a few minutes to complete the written survey that you may receive in the mail after your visit with us. Thank you!             Your Updated Medication List - Protect others around you: Learn how to safely use, store and throw away your medicines at www.disposemymeds.org.          This list is accurate as of 2/20/18 11:15 AM.  Always use your most recent med list.                   Brand Name Dispense Instructions for use Diagnosis    * atomoxetine 80 MG capsule    STRATTERA    90 capsule    Take 1 capsule (80 mg) by mouth daily    ANGELINA (generalized anxiety disorder), Attention deficit hyperactivity disorder (ADHD), predominantly inattentive type       * atomoxetine 40 MG capsule    STRATTERA    14 capsule    Take 1 capsule (40 mg) by mouth daily    ANGELINA (generalized anxiety disorder), Attention deficit hyperactivity disorder (ADHD), predominantly inattentive type       * Notice:  This list has 2 medication(s) that are the same as other medications prescribed for you. Read the directions carefully, and ask your doctor or other care provider to review them with you.

## 2018-02-21 ASSESSMENT — ANXIETY QUESTIONNAIRES: GAD7 TOTAL SCORE: 4

## 2018-02-21 ASSESSMENT — PATIENT HEALTH QUESTIONNAIRE - PHQ9: SUM OF ALL RESPONSES TO PHQ QUESTIONS 1-9: 5

## 2018-04-05 ENCOUNTER — MYC MEDICAL ADVICE (OUTPATIENT)
Dept: FAMILY MEDICINE | Facility: CLINIC | Age: 33
End: 2018-04-05

## 2018-04-05 NOTE — TELEPHONE ENCOUNTER
"Dr. Wegener/covering providers-Please review and advise.  Per [employer], one of the adverse effects for this medication is \"Delay when starting to pass urine.\"    Thank you!  ELIZABETH DiazN, RN    "

## 2018-09-18 ENCOUNTER — OFFICE VISIT (OUTPATIENT)
Dept: FAMILY MEDICINE | Facility: CLINIC | Age: 33
End: 2018-09-18
Payer: COMMERCIAL

## 2018-09-18 VITALS
DIASTOLIC BLOOD PRESSURE: 78 MMHG | SYSTOLIC BLOOD PRESSURE: 156 MMHG | WEIGHT: 212 LBS | TEMPERATURE: 98.2 F | BODY MASS INDEX: 29.99 KG/M2 | HEART RATE: 108 BPM | OXYGEN SATURATION: 99 % | RESPIRATION RATE: 18 BRPM

## 2018-09-18 DIAGNOSIS — R20.2 PARESTHESIAS: ICD-10-CM

## 2018-09-18 DIAGNOSIS — F41.1 GENERALIZED ANXIETY DISORDER: Primary | ICD-10-CM

## 2018-09-18 PROCEDURE — 99214 OFFICE O/P EST MOD 30 MIN: CPT | Performed by: FAMILY MEDICINE

## 2018-09-18 RX ORDER — LORAZEPAM 0.5 MG/1
TABLET ORAL
Qty: 14 TABLET | Refills: 0 | Status: SHIPPED | OUTPATIENT
Start: 2018-09-18 | End: 2019-03-20

## 2018-09-18 ASSESSMENT — ANXIETY QUESTIONNAIRES
2. NOT BEING ABLE TO STOP OR CONTROL WORRYING: NEARLY EVERY DAY
GAD7 TOTAL SCORE: 18
1. FEELING NERVOUS, ANXIOUS, OR ON EDGE: NEARLY EVERY DAY
6. BECOMING EASILY ANNOYED OR IRRITABLE: NOT AT ALL
7. FEELING AFRAID AS IF SOMETHING AWFUL MIGHT HAPPEN: NEARLY EVERY DAY
IF YOU CHECKED OFF ANY PROBLEMS ON THIS QUESTIONNAIRE, HOW DIFFICULT HAVE THESE PROBLEMS MADE IT FOR YOU TO DO YOUR WORK, TAKE CARE OF THINGS AT HOME, OR GET ALONG WITH OTHER PEOPLE: EXTREMELY DIFFICULT
5. BEING SO RESTLESS THAT IT IS HARD TO SIT STILL: NEARLY EVERY DAY
3. WORRYING TOO MUCH ABOUT DIFFERENT THINGS: NEARLY EVERY DAY

## 2018-09-18 ASSESSMENT — PATIENT HEALTH QUESTIONNAIRE - PHQ9: 5. POOR APPETITE OR OVEREATING: NEARLY EVERY DAY

## 2018-09-18 NOTE — MR AVS SNAPSHOT
After Visit Summary   9/18/2018    Rosalio Odonnell    MRN: [id number]           Patient Information     Date Of Birth          1985        Visit Information        Provider Department      9/18/2018 1:00 PM Wegener, Joel Daniel Irwin, MD [facility]        Today's Diagnoses     Generalized anxiety disorder    -  1    Paresthesias           Follow-ups after your visit        Your next 10 appointments already scheduled     Sep 25, 2018  3:00 PM CDT   PHYSICAL with Joel Daniel Irwin Wegener, MD   [facility]    [address] [place] [id number] [phone]              Who to contact     If you have questions or need follow up information about today's clinic visit or your schedule please contact [facility] directly at [phone].  Normal or non-critical lab and imaging results will be communicated to you by MyChart, letter or phone within 4 business days after the clinic has received the results. If you do not hear from us within 7 days, please contact the clinic through MyChart or phone. If you have a critical or abnormal lab result, we will notify you by phone as soon as possible.  Submit refill requests through [employer] or call your pharmacy and they will forward the refill request to us. Please allow 3 business days for your refill to be completed.          Additional Information About Your Visit        MyChart Information     [employer] gives you secure access to your electronic health record. If you see a primary care provider, you can also send messages to your care team and make appointments. If you have questions, please call your primary care clinic.  If you do not have a primary care provider, please call [phone] and they will assist you.        Care EveryWhere ID     This is your Care EveryWhere ID. This could be used by other organizations to access your [place] medical records  [id number]         Your Vitals Were     Pulse Temperature Respirations Pulse Oximetry BMI (Body Mass Index)       108 98.2  F (36.8  C) (Oral) 18 99% 29.99 kg/m2        Blood Pressure from Last 3 Encounters:   09/18/18 156/78   02/20/18 139/78   04/07/17 130/84    Weight from Last 3 Encounters:   09/18/18 212 lb (96.2 kg)   02/20/18 227 lb (103 kg)   04/07/17 223 lb (101.2 kg)              Today, you had the following     No orders found for display         Today's Medication Changes          These changes are accurate as of 9/18/18  1:52 PM.  If you have any questions, ask your nurse or doctor.               Start taking these medicines.        Dose/Directions    LORazepam 0.5 MG tablet   Commonly known as:  ATIVAN   Used for:  Generalized anxiety disorder   Started by:  Wegener, Joel Daniel Irwin, MD        1-2 tablets daily as needed for anxiety and sleep   Quantity:  14 tablet   Refills:  0            Where to get your medicines      Some of these will need a paper prescription and others can be bought over the counter.  Ask your nurse if you have questions.     Bring a paper prescription for each of these medications     LORazepam 0.5 MG tablet                Primary Care Provider Office Phone # Fax #    Joel Daniel Irwin Wegener, [phone] [address] [place] [address]        Equal Access to Services     JAMIE KIM : Hadii aad ku hadasho Soomaali, waaxda luqadaha, qaybta kaalmada adeegyada, yue sanchez. So [facility] [place] [facility] [phone].    ATENCIÓN: Si habla español, tiene a ivory disposición servicios gratuitos de asistencia lingüística. Llame al [phone].    We comply with applicable federal civil rights laws and [place] laws. We do not discriminate on the basis of race, color, national origin, age, disability, sex, sexual orientation, or gender identity.            Thank you!     Thank you for choosing [facility]  for your care. Our goal is always to  provide you with excellent care. Hearing back from our patients is one way we can continue to improve our services. Please take a few minutes to complete the written survey that you may receive in the mail after your visit with us. Thank you!             Your Updated Medication List - Protect others around you: Learn how to safely use, store and throw away your medicines at www.disposemymeds.org.          This list is accurate as of 9/18/18  1:52 PM.  Always use your most recent med list.                   Brand Name Dispense Instructions for use Diagnosis    * atomoxetine 80 MG capsule    STRATTERA    90 capsule    Take 1 capsule (80 mg) by mouth daily    ANGELINA (generalized anxiety disorder), Attention deficit hyperactivity disorder (ADHD), predominantly inattentive type       * atomoxetine 40 MG capsule    STRATTERA    14 capsule    Take 1 capsule (40 mg) by mouth daily    ANGELINA (generalized anxiety disorder), Attention deficit hyperactivity disorder (ADHD), predominantly inattentive type       LORazepam 0.5 MG tablet    ATIVAN    14 tablet    1-2 tablets daily as needed for anxiety and sleep    Generalized anxiety disorder       * Notice:  This list has 2 medication(s) that are the same as other medications prescribed for you. Read the directions carefully, and ask your doctor or other care provider to review them with you.

## 2018-09-20 ASSESSMENT — PATIENT HEALTH QUESTIONNAIRE - PHQ9: SUM OF ALL RESPONSES TO PHQ QUESTIONS 1-9: 5

## 2018-09-20 ASSESSMENT — ANXIETY QUESTIONNAIRES: GAD7 TOTAL SCORE: 18

## 2018-09-25 ENCOUNTER — OFFICE VISIT (OUTPATIENT)
Dept: FAMILY MEDICINE | Facility: CLINIC | Age: 33
End: 2018-09-25
Payer: COMMERCIAL

## 2018-09-25 VITALS
DIASTOLIC BLOOD PRESSURE: 79 MMHG | HEIGHT: 70 IN | SYSTOLIC BLOOD PRESSURE: 138 MMHG | HEART RATE: 68 BPM | TEMPERATURE: 98.2 F | RESPIRATION RATE: 20 BRPM | BODY MASS INDEX: 30.64 KG/M2 | OXYGEN SATURATION: 99 % | WEIGHT: 214 LBS

## 2018-09-25 DIAGNOSIS — F41.1 GENERALIZED ANXIETY DISORDER: ICD-10-CM

## 2018-09-25 DIAGNOSIS — Z13.6 CARDIOVASCULAR SCREENING; LDL GOAL LESS THAN 160: ICD-10-CM

## 2018-09-25 DIAGNOSIS — Z00.00 ROUTINE GENERAL MEDICAL EXAMINATION AT A HEALTH CARE FACILITY: Primary | ICD-10-CM

## 2018-09-25 DIAGNOSIS — F51.01 PRIMARY INSOMNIA: ICD-10-CM

## 2018-09-25 PROCEDURE — 36415 COLL VENOUS BLD VENIPUNCTURE: CPT | Performed by: FAMILY MEDICINE

## 2018-09-25 PROCEDURE — 99395 PREV VISIT EST AGE 18-39: CPT | Performed by: FAMILY MEDICINE

## 2018-09-25 PROCEDURE — 99213 OFFICE O/P EST LOW 20 MIN: CPT | Mod: 25 | Performed by: FAMILY MEDICINE

## 2018-09-25 PROCEDURE — 80061 LIPID PANEL: CPT | Performed by: FAMILY MEDICINE

## 2018-09-25 PROCEDURE — 84443 ASSAY THYROID STIM HORMONE: CPT | Performed by: FAMILY MEDICINE

## 2018-09-25 PROCEDURE — 82306 VITAMIN D 25 HYDROXY: CPT | Performed by: FAMILY MEDICINE

## 2018-09-25 RX ORDER — TRAZODONE HYDROCHLORIDE 50 MG/1
50 TABLET, FILM COATED ORAL
Qty: 90 TABLET | Refills: 1 | Status: SHIPPED | OUTPATIENT
Start: 2018-09-25 | End: 2019-03-23

## 2018-09-25 NOTE — MR AVS SNAPSHOT
"              After Visit Summary   9/25/2018    Rosalio Odonnell    MRN: [id number]           Patient Information     Date Of Birth          1985        Visit Information        Provider Department      9/25/2018 3:00 PM Wegener, Joel Daniel Irwin, MD [facility]        Today's Diagnoses     Routine general medical examination at a health care facility    -  1    Generalized anxiety disorder        Primary insomnia        CARDIOVASCULAR SCREENING; LDL GOAL LESS THAN 160          Care Instructions    ASSESSMENT AND PLAN  1. Routine general medical examination at a health care facility  Already had flu shot.     2. Generalized anxiety disorder  Lorazepam worked very well.  Taking only at night, one week in.  Neurologic symptoms (paresthesias) nearly resolved.  Helped sleep a lot.  Used multiple ssris in past and would rather not use.     Check labs to eval for hypothyroid /low vit d as cause of this and insomnia.     - TSH with free T4 reflex  - Vitamin D Deficiency    3. Primary insomnia  Change to trazodone 50 mg at night as needed instead of lorazepam when that runs out.  Discussed amitriptyline and mirtazepine as options as well  .  Discussed common side effects of all three.   - traZODone (DESYREL) 50 MG tablet; Take 1 tablet (50 mg) by mouth nightly as needed for sleep  Dispense: 90 tablet; Refill: 1    4. CARDIOVASCULAR SCREENING; LDL GOAL LESS THAN 160  Today.     - Lipid panel reflex to direct LDL Fasting    Follow up one year or earlier as needed.       MYCHART FOR ON-LINE CARE(VISITS), LABS, REFILLS, MESSAGING, ETC [email or web address] , [phone]    E-VISIT: click \"on-line care, then request e-visit\".  E-visits work well for following up on issues we have discussed in clinic previously which may need new prescriptions, new prescriptions or substantial discussion. These are always done by me (Dr. Wegener).     ONCARE VISIT:  [email or web address]  - we treat nearly 50 common " conditions through on-care.  These are done in an hour by on-call staff.     RADIOLOGY:  [facility]:  [phone]   [facility]: [phone]    Mammogram and Colonoscopy [phone]    Smoking Cessation: www.[email or web address], [phone]-PLAN ([phone])      CONSUMER PRICE LINE for estimates of test costs:  [phone]       Preventive Health Recommendations  Male Ages 26 - 39    Yearly exam:             See your health care provider every year in order to  o   Review health changes.   o   Discuss preventive care.    o   Review your medicines if your doctor has prescribed any.    You should be tested each year for STDs (sexually transmitted diseases), if you re at risk.     After age 35, talk to your provider about cholesterol testing. If you are at risk for heart disease, have your cholesterol tested at least every 5 years.     If you are at risk for diabetes, you should have a diabetes test (fasting glucose).  Shots: Get a flu shot each year. Get a tetanus shot every 10 years.     Nutrition:    Eat at least 5 servings of fruits and vegetables daily.     Eat whole-grain bread, whole-wheat pasta and brown rice instead of white grains and rice.     Get adequate Calcium and Vitamin D.     Lifestyle    Exercise for at least 150 minutes a week (30 minutes a day, 5 days a week). This will help you control your weight and prevent disease.     Limit alcohol to one drink per day.     No smoking.     Wear sunscreen to prevent skin cancer.     See your dentist every six months for an exam and cleaning.             Follow-ups after your visit        Who to contact     If you have questions or need follow up information about today's clinic visit or your schedule please contact [facility] directly at [phone].  Normal or non-critical lab and imaging results will be communicated to you by MyChart, letter or phone within 4 business days after the clinic has received the results. If you do  "not hear from us within 7 days, please contact the clinic through [employer] or phone. If you have a critical or abnormal lab result, we will notify you by phone as soon as possible.  Submit refill requests through [employer] or call your pharmacy and they will forward the refill request to us. Please allow 3 business days for your refill to be completed.          Additional Information About Your Visit        [employer] Information     [employer] gives you secure access to your electronic health record. If you see a primary care provider, you can also send messages to your care team and make appointments. If you have questions, please call your primary care clinic.  If you do not have a primary care provider, please call [phone] and they will assist you.        Care EveryWhere ID     This is your Care EveryWhere ID. This could be used by other organizations to access your [facility] medical records  [id number]        Your Vitals Were     Pulse Temperature Respirations Height Pulse Oximetry BMI (Body Mass Index)    68 98.2  F (36.8  C) (Oral) 20 5' 10\" (1.778 m) 99% 30.71 kg/m2       Blood Pressure from Last 3 Encounters:   09/25/18 138/79   09/18/18 156/78   02/20/18 139/78    Weight from Last 3 Encounters:   09/25/18 214 lb (97.1 kg)   09/18/18 212 lb (96.2 kg)   02/20/18 227 lb (103 kg)              We Performed the Following     Lipid panel reflex to direct LDL Fasting     TSH with free T4 reflex     Vitamin D Deficiency          Today's Medication Changes          These changes are accurate as of 9/25/18  3:40 PM.  If you have any questions, ask your nurse or doctor.               Start taking these medicines.        Dose/Directions    traZODone 50 MG tablet   Commonly known as:  DESYREL   Used for:  Primary insomnia   Started by:  Wegener, Joel Daniel Irwin, MD        Dose:  50 mg   Take 1 tablet (50 mg) by mouth nightly as needed for sleep   Quantity:  90 tablet   Refills:  1         Stop taking these medicines if " you haven't already. Please contact your care team if you have questions.     atomoxetine 40 MG capsule   Commonly known as:  STRATTERA   Stopped by:  Wegener, Joel Daniel Irwin, MD           atomoxetine 80 MG capsule   Commonly known as:  STRATTERA   Stopped by:  Wegener, Joel Daniel Irwin, MD                Where to get your medicines      These medications were sent to [facility]/pharmacy #[id number] - [place], [place] - [address]  [address] [address]     Phone:  [phone]     traZODone 50 MG tablet                Primary Care Provider Office Phone # Fax #    Joel Daniel Irwin Wegener, [phone] [address]  [facility] [address]        Equal Access to Services     [facility] : Hadii aad ku hadasho Soomaali, waaxda luqadaha, qaybta kaalmada adeegyada, waxay idiin haybobon alexis ward . So [facility] [phone].    ATENCIÓN: Si habla español, tiene a ivory disposición servicios gratuitos de asistencia lingüística. [facility] [phone].    We comply with applicable federal civil rights laws and [place] laws. We do not discriminate on the basis of race, color, national origin, age, disability, sex, sexual orientation, or gender identity.            Thank you!     Thank you for choosing [facility]  for your care. Our goal is always to provide you with excellent care. Hearing back from our patients is one way we can continue to improve our services. Please take a few minutes to complete the written survey that you may receive in the mail after your visit with us. Thank you!             Your Updated Medication List - Protect others around you: Learn how to safely use, store and throw away your medicines at www.disposemymeds.org.          This list is accurate as of 9/25/18  3:40 PM.  Always use your most recent med list.                   Brand Name Dispense Instructions for use Diagnosis    LORazepam 0.5 MG tablet    ATIVAN    14 tablet    1-2 tablets  daily as needed for anxiety and sleep    Generalized anxiety disorder       traZODone 50 MG tablet    DESYREL    90 tablet    Take 1 tablet (50 mg) by mouth nightly as needed for sleep    Primary insomnia

## 2018-09-25 NOTE — PATIENT INSTRUCTIONS
"ASSESSMENT AND PLAN  1. Routine general medical examination at a health care facility  Already had flu shot.     2. Generalized anxiety disorder  Lorazepam worked very well.  Taking only at night, one week in.  Neurologic symptoms (paresthesias) nearly resolved.  Helped sleep a lot.  Used multiple ssris in past and would rather not use.     Check labs to eval for hypothyroid /low vit d as cause of this and insomnia.     - TSH with free T4 reflex  - Vitamin D Deficiency    3. Primary insomnia  Change to trazodone 50 mg at night as needed instead of lorazepam when that runs out.  Discussed amitriptyline and mirtazepine as options as well  .  Discussed common side effects of all three.   - traZODone (DESYREL) 50 MG tablet; Take 1 tablet (50 mg) by mouth nightly as needed for sleep  Dispense: 90 tablet; Refill: 1    4. CARDIOVASCULAR SCREENING; LDL GOAL LESS THAN 160  Today.     - Lipid panel reflex to direct LDL Fasting    Follow up one year or earlier as needed.       MYCHART FOR ON-LINE CARE(VISITS), LABS, REFILLS, MESSAGING, ETC [email or web address] , [phone]    E-VISIT: click \"on-line care, then request e-visit\".  E-visits work well for following up on issues we have discussed in clinic previously which may need new prescriptions, new prescriptions or substantial discussion. These are always done by me (Dr. Wegener).     ONCARE VISIT:  [email or web address]  - we treat nearly 50 common conditions through on-care.  These are done in an hour by on-call staff.     RADIOLOGY:  [facility]:  [phone]   [facility]: [phone]    Mammogram and Colonoscopy [phone]    Smoking Cessation: www.[email or web address].org, 3-969-504-PLAN ([phone])      CONSUMER PRICE LINE for estimates of test costs:  [phone]       Preventive Health Recommendations  Male Ages 26 - 39    Yearly exam:             See your health care provider every year in order to  o   Review health changes.   o   Discuss " preventive care.    o   Review your medicines if your doctor has prescribed any.    You should be tested each year for STDs (sexually transmitted diseases), if you re at risk.     After age 35, talk to your provider about cholesterol testing. If you are at risk for heart disease, have your cholesterol tested at least every 5 years.     If you are at risk for diabetes, you should have a diabetes test (fasting glucose).  Shots: Get a flu shot each year. Get a tetanus shot every 10 years.     Nutrition:    Eat at least 5 servings of fruits and vegetables daily.     Eat whole-grain bread, whole-wheat pasta and brown rice instead of white grains and rice.     Get adequate Calcium and Vitamin D.     Lifestyle    Exercise for at least 150 minutes a week (30 minutes a day, 5 days a week). This will help you control your weight and prevent disease.     Limit alcohol to one drink per day.     No smoking.     Wear sunscreen to prevent skin cancer.     See your dentist every six months for an exam and cleaning.

## 2018-09-25 NOTE — PROGRESS NOTES
SUBJECTIVE:   CC: Rosalio Odonnell is an 33 year old male who presents for preventative health visit.     Physical   Annual:     Getting at least 3 servings of Calcium per day:  Yes    Bi-annual eye exam:  Yes    Dental care twice a year:  Yes    Sleep apnea or symptoms of sleep apnea:  None    Diet:  Regular (no restrictions)    Frequency of exercise:  4-5 days/week    Duration of exercise:  30-45 minutes    Taking medications regularly:  Yes    Medication side effects:  None    Additional concerns today:  YES        PROBLEMS TO ADD ON...  -------------------------------------  Anxiety.  Lorazepam worked very well.  Taking only at night, one week in.  Neurologic symptoms (paresthesias) nearly resolved.  Helped sleep a lot.  Used multiple ssris in past and would rather not use. Did not do MRI brain yet.     Today's PHQ-2 Score:   PHQ-2 ( 1999 [employer]) 9/25/2018   Q1: Little interest or pleasure in doing things 0   Q2: Feeling down, depressed or hopeless 0   PHQ-2 Score 0   Q1: Little interest or pleasure in doing things Not at all   Q2: Feeling down, depressed or hopeless Not at all   PHQ-2 Score 0       Abuse: Current or Past(Physical, Sexual or Emotional)- No  Do you feel safe in your environment - Yes    Social History   Substance Use Topics     Smoking status: Never Smoker     Smokeless tobacco: Never Used     Alcohol use Yes      Comment: rare     Alcohol Use 9/25/2018   If you drink alcohol do you typically have greater than 3 drinks per day OR greater than 7 drinks per week? No       Last PSA: No results found for: PSA    Reviewed orders with patient. Reviewed health maintenance and updated orders accordingly - Yes  Labs reviewed in EPIC    Reviewed and updated as needed this visit by clinical staff  Tobacco  Allergies  Meds  Med Hx  Surg Hx  Fam Hx  Soc Hx        Reviewed and updated as needed this visit by Provider        History reviewed. No pertinent past medical history.   History reviewed. No  "pertinent surgical history.    Review of Systems  CONSTITUTIONAL: NEGATIVE for fever, chills, change in weight  INTEGUMENTARY/SKIN: NEGATIVE for worrisome rashes, moles or lesions  EYES: NEGATIVE for vision changes or irritation  ENT: NEGATIVE for ear, mouth and throat problems  RESP: NEGATIVE for significant cough or SOB  CV: NEGATIVE for chest pain, palpitations or peripheral edema  GI: NEGATIVE for nausea, abdominal pain, heartburn, or change in bowel habits   male: negative for dysuria, hematuria, decreased urinary stream, erectile dysfunction, urethral discharge  MUSCULOSKELETAL: NEGATIVE for significant arthralgias or myalgia  NEURO: NEGATIVE for weakness, dizziness or paresthesias  PSYCHIATRIC: NEGATIVE for changes in mood or affect    OBJECTIVE:   /79  Pulse 68  Temp 98.2  F (36.8  C) (Oral)  Resp 20  Ht 5' 10\" (1.778 m)  Wt 214 lb (97.1 kg)  SpO2 99%  BMI 30.71 kg/m2    Physical Exam  GENERAL: healthy, alert and no distress  EYES: Eyes grossly normal to inspection, PERRL and conjunctivae and sclerae normal  HENT: ear canals and TM's normal, nose and mouth without ulcers or lesions  NECK: no adenopathy, no asymmetry, masses, or scars and thyroid normal to palpation  RESP: lungs clear to auscultation - no rales, rhonchi or wheezes  CV: regular rate and rhythm, normal S1 S2, no S3 or S4, no murmur, click or rub, no peripheral edema and peripheral pulses strong  ABDOMEN: soft, nontender, no hepatosplenomegaly, no masses and bowel sounds normal   (male): normal male genitalia without lesions or urethral discharge, no hernia  MS: no gross musculoskeletal defects noted, no edema  SKIN: no suspicious lesions or rashes  NEURO: Normal strength and tone, mentation intact and speech normal  PSYCH:     MENTAL STATUS EXAM  Appearance: appropriate  Attitude: cooperative  Behavior: normal  Eyre Contact: normal  Speech: normal  Orientation: oreinted to person , place, time and situation  Mood:  admits no " "sadness and anxiety most of time  Affect: Mood Congruient  Thought Process: clear  Suicidal Ideation: reports no  thoughts, no intention  Hallucination: no      Diagnostic Test Results:  none     ASSESSMENT/PLAN:   ASSESSMENT AND PLAN  1. Routine general medical examination at a health care facility  Already had flu shot.     2. Generalized anxiety disorder  Lorazepam worked very well.  Taking only at night, one week in.  Neurologic symptoms (paresthesias) nearly resolved.  Helped sleep a lot.  Used multiple ssris in past and would rather not use.     Check labs to eval for hypothyroid /low vit d as cause of this and insomnia.     - TSH with free T4 reflex  - Vitamin D Deficiency    3. Primary insomnia  Change to trazodone 50 mg at night as needed instead of lorazepam when that runs out.  Discussed amitriptyline and mirtazepine as options as well  .  Discussed common side effects of all three.   - traZODone (DESYREL) 50 MG tablet; Take 1 tablet (50 mg) by mouth nightly as needed for sleep  Dispense: 90 tablet; Refill: 1    4. CARDIOVASCULAR SCREENING; LDL GOAL LESS THAN 160  Today.     - Lipid panel reflex to direct LDL Fasting    Follow up one year or earlier as needed.     COUNSELING:   Reviewed preventive health counseling, as reflected in patient instructions       Regular exercise       Healthy diet/nutrition    BP Readings from Last 1 Encounters:   09/25/18 138/79     Estimated body mass index is 30.71 kg/(m^2) as calculated from the following:    Height as of this encounter: 5' 10\" (1.778 m).    Weight as of this encounter: 214 lb (97.1 kg).    BP Screening:   Last 3 BP Readings:    BP Readings from Last 3 Encounters:   09/25/18 138/79   09/18/18 156/78   02/20/18 139/78       The following was recommended to the patient:  Re-screen BP within a year and recommended lifestyle modifications  Weight management plan: Discussed healthy diet and exercise guidelines and patient will follow up in 12 months in clinic " to re-evaluate.     reports that he has never smoked. He has never used smokeless tobacco.      Counseling Resources:  ATP IV Guidelines  Pooled Cohorts Equation Calculator  FRAX Risk Assessment  ICSI Preventive Guidelines  Dietary Guidelines for Americans, 2010  USDA's MyPlate  ASA Prophylaxis  Lung CA Screening    Joel Daniel Wegener, MD  [facility]  Answers for HPI/ROS submitted by the patient on 9/25/2018   PHQ-2 Score: 0

## 2018-09-26 LAB
CHOLEST SERPL-MCNC: 155 MG/DL
DEPRECATED CALCIDIOL+CALCIFEROL SERPL-MC: 19 UG/L (ref 20–75)
HDLC SERPL-MCNC: 61 MG/DL
LDLC SERPL CALC-MCNC: 77 MG/DL
NONHDLC SERPL-MCNC: 94 MG/DL
TRIGL SERPL-MCNC: 86 MG/DL
TSH SERPL DL<=0.005 MIU/L-ACNC: 1.89 MU/L (ref 0.4–4)

## 2019-03-10 ENCOUNTER — OFFICE VISIT (OUTPATIENT)
Dept: URGENT CARE | Facility: URGENT CARE | Age: 34
End: 2019-03-10
Payer: COMMERCIAL

## 2019-03-10 VITALS
RESPIRATION RATE: 12 BRPM | TEMPERATURE: 98.2 F | SYSTOLIC BLOOD PRESSURE: 136 MMHG | HEIGHT: 71 IN | BODY MASS INDEX: 30.1 KG/M2 | HEART RATE: 90 BPM | WEIGHT: 215 LBS | DIASTOLIC BLOOD PRESSURE: 90 MMHG

## 2019-03-10 DIAGNOSIS — K29.00 ACUTE SUPERFICIAL GASTRITIS WITHOUT HEMORRHAGE: Primary | ICD-10-CM

## 2019-03-10 PROCEDURE — 99213 OFFICE O/P EST LOW 20 MIN: CPT | Performed by: FAMILY MEDICINE

## 2019-03-10 ASSESSMENT — MIFFLIN-ST. JEOR: SCORE: 1952.36

## 2019-03-10 NOTE — PROGRESS NOTES
Subjective: About a month ago he had to take 5 days of intensive ibuprofen for a tendinitis and shortly after that he noticed that he was burping more frequently and having a little discomfort in the epigastric area.  He remembers something like this years ago.  He kept figuring it would go away.  Then he tried some occasional Zantac 75 mg, did not help much, yesterday for the first time took Nexium.  The symptoms are mild.  He does have about 3 cups of coffee a day and he is trying to cut that back and he also drinks carbonated beverages.  He has had occasional heartburn.  There is just a vague nausea feeling with it sometimes as well.  His tried getting off of milk, just using almond milk.  He works as an .  Normally 3 cups of coffee a day.    Objective: Heart is regular without murmurs.  The abdomen is benign.  Vitals are stable    Assessment and plan: This sounds like a mild gastroenteritis probably set off by the ibuprofen.  It is having trouble healing.  I think his idea of trying Nexium is good and I think if it is helping within a couple of days he should be on it for about a month and then taper off.  I suspect that we will solve the problems.  Getting off caffeine would be smart.  Avoiding carbonated beverages would be a good idea as well and also avoiding ibuprofen, using more Tylenol as needed.  If things are getting better he should follow-up with his regular doctor

## 2019-03-18 ENCOUNTER — E-VISIT (OUTPATIENT)
Dept: FAMILY MEDICINE | Facility: CLINIC | Age: 34
End: 2019-03-18
Payer: COMMERCIAL

## 2019-03-18 DIAGNOSIS — R14.2 FLATULENCE, ERUCTATION AND GAS PAIN: Primary | ICD-10-CM

## 2019-03-18 DIAGNOSIS — R14.3 FLATULENCE, ERUCTATION AND GAS PAIN: Primary | ICD-10-CM

## 2019-03-18 DIAGNOSIS — R14.1 FLATULENCE, ERUCTATION AND GAS PAIN: Primary | ICD-10-CM

## 2019-03-18 PROCEDURE — 99207 ZZC NO BILLABLE SERVICE THIS VISIT: CPT | Performed by: FAMILY MEDICINE

## 2019-03-20 ENCOUNTER — OFFICE VISIT (OUTPATIENT)
Dept: FAMILY MEDICINE | Facility: CLINIC | Age: 34
End: 2019-03-20
Payer: COMMERCIAL

## 2019-03-20 VITALS
SYSTOLIC BLOOD PRESSURE: 140 MMHG | WEIGHT: 221.31 LBS | TEMPERATURE: 98.6 F | HEART RATE: 106 BPM | RESPIRATION RATE: 16 BRPM | BODY MASS INDEX: 30.87 KG/M2 | OXYGEN SATURATION: 99 % | DIASTOLIC BLOOD PRESSURE: 80 MMHG

## 2019-03-20 DIAGNOSIS — F41.1 GENERALIZED ANXIETY DISORDER: ICD-10-CM

## 2019-03-20 DIAGNOSIS — R12 HEARTBURN: ICD-10-CM

## 2019-03-20 DIAGNOSIS — R10.13 EPIGASTRIC PAIN: ICD-10-CM

## 2019-03-20 DIAGNOSIS — R14.2 FLATULENCE, ERUCTATION AND GAS PAIN: Primary | ICD-10-CM

## 2019-03-20 DIAGNOSIS — R14.1 FLATULENCE, ERUCTATION AND GAS PAIN: Primary | ICD-10-CM

## 2019-03-20 DIAGNOSIS — R14.3 FLATULENCE, ERUCTATION AND GAS PAIN: Primary | ICD-10-CM

## 2019-03-20 PROCEDURE — 99214 OFFICE O/P EST MOD 30 MIN: CPT | Performed by: FAMILY MEDICINE

## 2019-03-20 RX ORDER — SUCRALFATE 1 G/1
1 TABLET ORAL 4 TIMES DAILY
Qty: 56 TABLET | Refills: 0 | Status: SHIPPED | OUTPATIENT
Start: 2019-03-20 | End: 2019-08-01

## 2019-03-20 RX ORDER — LORAZEPAM 0.5 MG/1
TABLET ORAL
Qty: 14 TABLET | Refills: 0 | Status: SHIPPED | OUTPATIENT
Start: 2019-03-20 | End: 2022-07-21

## 2019-03-20 ASSESSMENT — ANXIETY QUESTIONNAIRES
6. BECOMING EASILY ANNOYED OR IRRITABLE: SEVERAL DAYS
IF YOU CHECKED OFF ANY PROBLEMS ON THIS QUESTIONNAIRE, HOW DIFFICULT HAVE THESE PROBLEMS MADE IT FOR YOU TO DO YOUR WORK, TAKE CARE OF THINGS AT HOME, OR GET ALONG WITH OTHER PEOPLE: EXTREMELY DIFFICULT
1. FEELING NERVOUS, ANXIOUS, OR ON EDGE: NEARLY EVERY DAY
GAD7 TOTAL SCORE: 19
2. NOT BEING ABLE TO STOP OR CONTROL WORRYING: NEARLY EVERY DAY
7. FEELING AFRAID AS IF SOMETHING AWFUL MIGHT HAPPEN: NEARLY EVERY DAY
5. BEING SO RESTLESS THAT IT IS HARD TO SIT STILL: NEARLY EVERY DAY
3. WORRYING TOO MUCH ABOUT DIFFERENT THINGS: NEARLY EVERY DAY

## 2019-03-20 ASSESSMENT — PATIENT HEALTH QUESTIONNAIRE - PHQ9
SUM OF ALL RESPONSES TO PHQ QUESTIONS 1-9: 5
5. POOR APPETITE OR OVEREATING: NEARLY EVERY DAY

## 2019-03-20 NOTE — PROGRESS NOTES
SUBJECTIVE:   Rosalio Odonnell is a 33 year old male who presents to clinic today for the following health issues:      Burping problem      Duration: 7 weeks    Description (location/character/radiation): consistent burping every 15 minutes    Accompanying signs and symptoms: discomfort below the sternum comes and goes     History (similar episodes/previous evaluation): None    Precipitating or alleviating factors: patient notice laying down prevents pt from burping often.     Therapies tried and outcome: was seen at [facility] 3/10/19; pt is taking Nexium medication - is not helping           Flatulence, eructation and gas pain  Heartburn  Epigastric pain  Generalized anxiety disorder :increased with these symptoms.     No chest pain or radiation to arms, jaw, no diaphoresis.         Problem list, Medication list, Allergies, and Medical/Social/Surgical histories reviewed in [facility] and updated as appropriate.  Labs reviewed in EPIC  BP Readings from Last 3 Encounters:   03/20/19 140/80   03/10/19 136/90   09/25/18 138/79    Wt Readings from Last 3 Encounters:   03/20/19 100.4 kg (221 lb 5 oz)   03/10/19 97.5 kg (215 lb)   09/25/18 97.1 kg (214 lb)                  Patient Active Problem List   Diagnosis     Generalized anxiety disorder     CARDIOVASCULAR SCREENING; LDL GOAL LESS THAN 160     Focal nodular hyperplasia of liver     Liver mass     History reviewed. No pertinent surgical history.    Social History     Tobacco Use     Smoking status: Never Smoker     Smokeless tobacco: Never Used   Substance Use Topics     Alcohol use: Yes     Comment: rare     Family History   Problem Relation Age of Onset     Rheumatoid Arthritis Mother          Current Outpatient Medications   Medication Sig Dispense Refill     LORazepam (ATIVAN) 0.5 MG tablet 1-2 tablets daily as needed for anxiety and sleep 14 tablet 0     sucralfate (CARAFATE) 1 GM tablet Take 1 tablet (1 g) by mouth 4 times daily for 14 days 56 tablet 0     traZODone  (DESYREL) 50 MG tablet TAKE 1 TABLET (50 MG) BY MOUTH NIGHTLY AS NEEDED FOR SLEEP 90 tablet 3     Allergies   Allergen Reactions     Amoxicillin Rash     Recent Labs   Lab Test 09/25/18  1549 01/23/17  1649 11/11/15  1311 02/10/14  1514   LDL 77  --  69 79   HDL 61  --  60 59   TRIG 86  --  106 53   TSH 1.89 2.21  --  2.27        ROS:  Constitutional, HEENT, cardiovascular, pulmonary, GI, , musculoskeletal, neuro, skin, endocrine and psych systems are negative, except as otherwise noted.        OBJECTIVE:  /80   Pulse 106   Temp 98.6  F (37  C) (Oral)   Resp 16   Wt 100.4 kg (221 lb 5 oz)   SpO2 99%   BMI 30.87 kg/m      EXAM:  GENERAL APPEARANCE: healthy, alert and no distress, appears worried  RESP: lungs clear to auscultation - no rales, rhonchi or wheezes  CV: regular rates and rhythm, normal S1 S2, no S3 or S4 and no murmur, click or rub -  ABDOMEN:  soft, nontender, no HSM or masses and bowel sounds normal      ASSESSMENT AND PLAN  Patient Instructions   ASSESSMENT AND PLAN  1. Flatulence, eructation and gas pain  Overall symptoms consistent with gastritis vs gerd flair due to nsaids.      Limited other possible triggers except caffeine possibly - has stopped this.     Continue nexium.     Recommend adding sucralfate as stomach  for two weeks.     Will do ultrasound to exclude gallstones/biliary colic.     If no substantially improved in 2-3 weeks would recommend either ct scan or upper endoscopy.   - US Abdomen Limited; Future    2. Heartburn    - sucralfate (CARAFATE) 1 GM tablet; Take 1 tablet (1 g) by mouth 4 times daily for 14 days  Dispense: 56 tablet; Refill: 0  - US Abdomen Limited; Future    3. Epigastric pain    - US Abdomen Limited; Future    4. Generalized anxiety disorder  Has triggered increased anxiety . Having plan usually helps and short course of lorazepam. Refilled today.  [place] suspicious activity on [place] drug database.   No concerns for abuse and has used  "appropriately in the past.     - LORazepam (ATIVAN) 0.5 MG tablet; 1-2 tablets daily as needed for anxiety and sleep  Dispense: 14 tablet; Refill: 0    Follow up as needed or in  for yearly physical.       MYCHART FOR ON-LINE CARE(VISITS), LABS, REFILLS, MESSAGING, ETC [email or web address] , [phone]    E-VISIT: click \"on-line care, then request e-visit\".  E-visits work well for following up on issues we have discussed in clinic previously which may need new prescriptions, new prescriptions or substantial discussion. These are always done by me (Dr. Wegener).     ONCARE VISIT:  [email or web address]  - we treat nearly 50 common conditions through on-care.  These are done in an hour by on-call staff.     RADIOLOGY:  [facility]:  [phone]   [facility]: [phone]    Mammogram and Colonoscopy [phone]    Smoking Cessation: www.[email or web address], 3-074-622-PLAN ([phone])      CONSUMER PRICE LINE for estimates of test costs:  [phone]             Joel Wegener, MD        "

## 2019-03-20 NOTE — PATIENT INSTRUCTIONS
"ASSESSMENT AND PLAN  1. Flatulence, eructation and gas pain  Overall symptoms consistent with gastritis vs gerd flair due to nsaids.      Limited other possible triggers except caffeine possibly - has stopped this.     Continue nexium.     Recommend adding sucralfate as stomach  for two weeks.     Will do ultrasound to exclude gallstones/biliary colic.     If no substantially improved in 2-3 weeks would recommend either ct scan or upper endoscopy.   - US Abdomen Limited; Future    2. Heartburn    - sucralfate (CARAFATE) 1 GM tablet; Take 1 tablet (1 g) by mouth 4 times daily for 14 days  Dispense: 56 tablet; Refill: 0  - US Abdomen Limited; Future    3. Epigastric pain    - US Abdomen Limited; Future    4. Generalized anxiety disorder  Has triggered increased anxiety . Having plan usually helps and short course of lorazepam. Refilled today.  No concerns for abuse and has used appropriately in the past.     - LORazepam (ATIVAN) 0.5 MG tablet; 1-2 tablets daily as needed for anxiety and sleep  Dispense: 14 tablet; Refill: 0    Follow up as needed or in  for yearly physical.       MYCHART FOR ON-LINE CARE(VISITS), LABS, REFILLS, MESSAGING, ETC [email or web address] [phone]    E-VISIT: click \"on-line care, then request e-visit\".  E-visits work well for following up on issues we have discussed in clinic previously which may need new prescriptions, new prescriptions or substantial discussion. These are always done by me (Dr. Wegener).     ONCARE VISIT:  [email or web address]  - we treat nearly 50 common conditions through on-care.  These are done in an hour by on-call staff.     RADIOLOGY:  [facility]:  [phone]   [facility]: [phone]    Mammogram and Colonoscopy [phone]    Smoking Cessation: [email or web address], [phone]-PLAN ([phone])      CONSUMER PRICE LINE for estimates of test costs:  [phone]       "

## 2019-03-21 ENCOUNTER — HOSPITAL ENCOUNTER (OUTPATIENT)
Dept: ULTRASOUND IMAGING | Facility: CLINIC | Age: 34
Discharge: HOME | End: 2019-03-21
Attending: FAMILY MEDICINE | Admitting: FAMILY MEDICINE
Payer: COMMERCIAL

## 2019-03-21 DIAGNOSIS — R14.3 FLATULENCE, ERUCTATION AND GAS PAIN: ICD-10-CM

## 2019-03-21 DIAGNOSIS — R14.2 FLATULENCE, ERUCTATION AND GAS PAIN: ICD-10-CM

## 2019-03-21 DIAGNOSIS — R14.1 FLATULENCE, ERUCTATION AND GAS PAIN: ICD-10-CM

## 2019-03-21 DIAGNOSIS — R10.13 EPIGASTRIC PAIN: ICD-10-CM

## 2019-03-21 DIAGNOSIS — R12 HEARTBURN: ICD-10-CM

## 2019-03-21 PROCEDURE — 76705 ECHO EXAM OF ABDOMEN: CPT

## 2019-03-21 ASSESSMENT — ANXIETY QUESTIONNAIRES: GAD7 TOTAL SCORE: 19

## 2019-03-23 DIAGNOSIS — F51.01 PRIMARY INSOMNIA: ICD-10-CM

## 2019-03-25 RX ORDER — TRAZODONE HYDROCHLORIDE 50 MG/1
50 TABLET, FILM COATED ORAL
Qty: 90 TABLET | Refills: 3 | Status: SHIPPED | OUTPATIENT
Start: 2019-03-25 | End: 2020-04-16

## 2019-03-25 NOTE — TELEPHONE ENCOUNTER
"Last OV : 3/20/19    Requested Prescriptions   Pending Prescriptions Disp Refills     traZODone (DESYREL) 50 MG tablet [Pharmacy Med Name: TRAZODONE 50 MG TABLET] 90 tablet 3     Sig: TAKE 1 TABLET (50 MG) BY MOUTH NIGHTLY AS NEEDED FOR SLEEP    Serotonin Modulators Passed - 3/23/2019 12:17 AM       Passed - Recent (12 mo) or future (30 days) visit within the authorizing provider's specialty    Patient had office visit in the last 12 months or has a visit in the next 30 days with authorizing provider or within the authorizing provider's specialty.  See \"Patient Info\" tab in inbasket, or \"Choose Columns\" in Meds & Orders section of the refill encounter.             Passed - Medication is active on med list       Passed - Patient is age 18 or older          Prescription approved per [facility] Refill Protocol.  Marlee Arauz RN      "

## 2019-03-26 ENCOUNTER — MYC MEDICAL ADVICE (OUTPATIENT)
Dept: FAMILY MEDICINE | Facility: CLINIC | Age: 34
End: 2019-03-26

## 2019-03-26 ENCOUNTER — HOSPITAL ENCOUNTER (OUTPATIENT)
Dept: MRI IMAGING | Facility: CLINIC | Age: 34
Discharge: HOME | End: 2019-03-26
Attending: FAMILY MEDICINE | Admitting: FAMILY MEDICINE
Payer: COMMERCIAL

## 2019-03-26 DIAGNOSIS — K76.89 FOCAL NODULAR HYPERPLASIA OF LIVER: ICD-10-CM

## 2019-03-26 DIAGNOSIS — R16.0 LIVER MASS: ICD-10-CM

## 2019-03-26 DIAGNOSIS — R16.0 LIVER MASS: Primary | ICD-10-CM

## 2019-03-26 PROCEDURE — 25500064 ZZH RX 255 OP 636: Performed by: FAMILY MEDICINE

## 2019-03-26 PROCEDURE — 74183 MRI ABD W/O CNTR FLWD CNTR: CPT

## 2019-03-26 PROCEDURE — A9585 GADOBUTROL INJECTION: HCPCS | Performed by: FAMILY MEDICINE

## 2019-03-26 RX ORDER — GADOBUTROL 604.72 MG/ML
10 INJECTION INTRAVENOUS ONCE
Status: COMPLETED | OUTPATIENT
Start: 2019-03-26 | End: 2019-03-26

## 2019-03-26 RX ADMIN — GADOBUTROL 10 ML: 604.72 INJECTION INTRAVENOUS at 18:14

## 2019-03-26 NOTE — TELEPHONE ENCOUNTER
"Call back re:    Yarsani,    The gallbladder ultrasound did not show any gallstones or infected gallbladder.     It did show a possible growth on the top of the liver.     Unfortunately the ultrasound is NOT the best way to look at growths like that.  These are usually non-cancerous \"hemangiomas\" (blood vessel growths).      In any event and MRI needs to be done to look at this more closely.     You can call [phone] to schedule the mri.      I'll have my team call you as well.     Regarding the fevers for now I guess just keep an eye on it and we will see if more obvious signs of infection develop.      Let's plan to meet in about two weeks to review the MRI results and see how you are feeling.     Best, Joel Wegener,MD      "

## 2019-03-26 NOTE — TELEPHONE ENCOUNTER
Dr Wegener - Please advise  Patient does mention the US he had done.  Please review.  Haylie Palomares RN

## 2019-03-26 NOTE — TELEPHONE ENCOUNTER
Sent pt this [employer] message.  I also called pt and reviewed this message.  He will get the MRI scheduled within one week and then f/u with pcp in 2 weeks.  LIV Somers

## 2019-03-28 PROBLEM — R16.0 LIVER MASS: Status: ACTIVE | Noted: 2019-03-28

## 2019-03-28 PROBLEM — K76.89 FOCAL NODULAR HYPERPLASIA OF LIVER: Status: ACTIVE | Noted: 2019-03-28

## 2019-04-09 ENCOUNTER — OFFICE VISIT (OUTPATIENT)
Dept: FAMILY MEDICINE | Facility: CLINIC | Age: 34
End: 2019-04-09
Payer: COMMERCIAL

## 2019-04-09 VITALS
OXYGEN SATURATION: 98 % | RESPIRATION RATE: 23 BRPM | DIASTOLIC BLOOD PRESSURE: 80 MMHG | TEMPERATURE: 98 F | WEIGHT: 225 LBS | HEART RATE: 114 BPM | SYSTOLIC BLOOD PRESSURE: 135 MMHG | BODY MASS INDEX: 31.38 KG/M2

## 2019-04-09 DIAGNOSIS — R14.1 FLATULENCE, ERUCTATION AND GAS PAIN: Primary | ICD-10-CM

## 2019-04-09 DIAGNOSIS — R14.3 FLATULENCE, ERUCTATION AND GAS PAIN: Primary | ICD-10-CM

## 2019-04-09 DIAGNOSIS — R14.2 FLATULENCE, ERUCTATION AND GAS PAIN: Primary | ICD-10-CM

## 2019-04-09 PROCEDURE — 99213 OFFICE O/P EST LOW 20 MIN: CPT | Performed by: FAMILY MEDICINE

## 2019-04-09 NOTE — PROGRESS NOTES
SUBJECTIVE:   Rosalio Odonnell is a 33 year old male who presents to clinic today for the following health issues:    Burping problem       Duration: 7+ weeks    Description (location/character/radiation): consistent burping every 15 minutes, sometimes only once an hour    Accompanying signs and symptoms: discomfort below the sternum comes and goes     History (similar episodes/previous evaluation): None    Precipitating or alleviating factors: patient notice laying down prevents pt from burping often.     Therapies tried and outcome: [place] [facility] [place]; pt is taking Nexium medication - is not helping      Rosalio has noticed some improvement with the burping after eating, although it is hard to tell if it is due to the medication (additon of Nexium and sucralfate) or dietary changes (elimination of carbonated beverages and caffeine.) Has not noticed any association with specific foods. Some occasional abd discomfort in the epigastric area, not any pain. No fever, N, V, changes in stool, melena, hematochezia, hematemesis, lightheadedness. No recent travel.     Other:  Review and follow up on MRI results:  IMPRESSION:  1. Arterial enhancing 5.3 cm lesion in segment VIII of the anterior  superior right hepatic lobe. 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 Due to subtle hypointensity on initial  postcontrast images, a follow-up exam in 3 months suggested to confirm  stability.  2. Remainder of the abdomen is unremarkable. No enlarged lymph nodes.       Flatulence, eructation and gas pain :        Problem list, Medication list, Allergies, and Medical/Social/Surgical histories reviewed in [facility] and updated as appropriate.  Labs reviewed in EPIC  BP Readings from Last 3 Encounters:   04/09/19 135/80   03/20/19 140/80   03/10/19 136/90    Wt Readings from Last 3 Encounters:   04/09/19 102.1 kg (225 lb)   03/20/19 100.4 kg (221 lb 5 oz)   03/10/19 97.5 kg (215 lb)                   Patient Active Problem List   Diagnosis     Generalized anxiety disorder     CARDIOVASCULAR SCREENING; LDL GOAL LESS THAN 160     Focal nodular hyperplasia of liver     Liver mass     History reviewed. No pertinent surgical history.    Social History     Tobacco Use     Smoking status: Never Smoker     Smokeless tobacco: Never Used   Substance Use Topics     Alcohol use: Yes     Comment: rare     Family History   Problem Relation Age of Onset     Rheumatoid Arthritis Mother          Current Outpatient Medications   Medication Sig Dispense Refill     LORazepam (ATIVAN) 0.5 MG tablet 1-2 tablets daily as needed for anxiety and sleep 14 tablet 0     traZODone (DESYREL) 50 MG tablet TAKE 1 TABLET (50 MG) BY MOUTH NIGHTLY AS NEEDED FOR SLEEP 90 tablet 3     Allergies   Allergen Reactions     Amoxicillin Rash     Recent Labs   Lab Test 09/25/18  1549 01/23/17  1649 11/11/15  1311 02/10/14  1514   LDL 77  --  69 79   HDL 61  --  60 59   TRIG 86  --  106 53   TSH 1.89 2.21  --  2.27        ROS:  Constitutional, HEENT, cardiovascular, pulmonary, GI, , musculoskeletal, neuro, skin, endocrine and psych systems are negative, except as otherwise noted.        OBJECTIVE:  /80   Pulse 114   Temp 98  F (36.7  C) (Oral)   Resp 23   Wt 102.1 kg (225 lb)   SpO2 98%   BMI 31.38 kg/m      EXAM:  GENERAL APPEARANCE: healthy, alert and no distress  RESP: lungs clear to auscultation - no rales, rhonchi or wheezes  CV: regular rates and rhythm, normal S1 S2, no S3 or S4 and no murmur, click or rub -  ABDOMEN:  soft, nontender, no HSM or masses and bowel sounds normal      ASSESSMENT AND PLAN  Patient Instructions   Buprping.     With possible gastritis or small ulcer since symptoms started after course of nsaid use and have improved with acid treatment although not fully resolved.     Continue acid blocker for one more month.     If not substantially improved then would recommend food allergy and h.pylori testing or can do  "anytime between now and again.     I am not concerned about the findings on liver MRI but would still recommend doing the follow up in three months.         Follow up September for physical.     ASSESSMENT AND PLAN  1. Flatulence, eructation and gas pain    - Allergy adult food panel; Future  - H Pylori antigen, stool; Future        MYCHART FOR ON-LINE CARE(VISITS), LABS, REFILLS, MESSAGING, ETC [email or web address] , [phone]    E-VISIT: click \"on-line care, then request e-visit\".  E-visits work well for following up on issues we have discussed in clinic previously which may need new prescriptions, new prescriptions or substantial discussion. These are always done by me (Dr. Wegener).     ONCARE VISIT:  [email or web address]  - we treat nearly 50 common conditions through on-care.  These are done in an hour by on-call staff.     RADIOLOGY:  [facility]:  [phone]   [facility]: [phone]    Mammogram and Colonoscopy [phone]    Smoking Cessation: [email or web address], 5-431-170-PLAN ([phone])      CONSUMER PRICE LINE for estimates of test costs:  [phone]         Joel Wegener, MD        "

## 2019-04-09 NOTE — PATIENT INSTRUCTIONS
Buprping.     With possible gastritis or small ulcer.      Continue acid blocker for one more month.     If not substantially improved then would recommend food allergy and h.pylori testing or can do anytime between now and again.     I am not concerned about the findings on liver MRI but would still recommend doing the follow up in three months.         Follow up September for physical.

## 2019-04-19 DIAGNOSIS — R14.2 FLATULENCE, ERUCTATION AND GAS PAIN: ICD-10-CM

## 2019-04-19 DIAGNOSIS — R14.3 FLATULENCE, ERUCTATION AND GAS PAIN: ICD-10-CM

## 2019-04-19 DIAGNOSIS — R14.1 FLATULENCE, ERUCTATION AND GAS PAIN: ICD-10-CM

## 2019-04-19 PROCEDURE — 82785 ASSAY OF IGE: CPT | Performed by: FAMILY MEDICINE

## 2019-04-19 PROCEDURE — 36415 COLL VENOUS BLD VENIPUNCTURE: CPT | Performed by: FAMILY MEDICINE

## 2019-04-19 PROCEDURE — 86003 ALLG SPEC IGE CRUDE XTRC EA: CPT | Mod: 59 | Performed by: FAMILY MEDICINE

## 2019-04-19 PROCEDURE — 87338 HPYLORI STOOL AG IA: CPT | Performed by: FAMILY MEDICINE

## 2019-04-22 LAB
H PYLORI AG STL QL IA: NORMAL
SPECIMEN SOURCE: NORMAL

## 2019-04-23 LAB
ALMOND IGE QN: <0.1 KU(A)/L
CASHEW NUT IGE QN: <0.1 KU(A)/L
CODFISH IGE QN: <0.1 KU(A)/L
COW MILK IGE QN: <0.1 KU(A)/L
EGG WHITE IGE QN: <0.1 KU(A)/L
HAZELNUT IGE QN: <0.1 KU(A)/L
IGE SERPL-ACNC: 20 KIU/L (ref 0–114)
PEANUT IGE QN: <0.1 KU(A)/L
SALMON IGE QN: <0.1 KU(A)/L
SCALLOP IGE QN: <0.1 KU(A)/L
SESAME SEED IGE QN: <0.1 KU(A)/L
SHRIMP IGE QN: <0.1 KU(A)/L
SOYBEAN IGE QN: <0.1 KU(A)/L
TUNA IGE QN: <0.1 KU(A)/L
WALNUT IGE QN: <0.1 KU(A)/L
WHEAT IGE QN: <0.1 KU(A)/L

## 2019-06-27 ENCOUNTER — HOSPITAL ENCOUNTER (OUTPATIENT)
Dept: MRI IMAGING | Facility: CLINIC | Age: 34
Discharge: HOME | End: 2019-06-27
Attending: FAMILY MEDICINE | Admitting: FAMILY MEDICINE
Payer: COMMERCIAL

## 2019-06-27 DIAGNOSIS — K76.89 FOCAL NODULAR HYPERPLASIA OF LIVER: ICD-10-CM

## 2019-06-27 DIAGNOSIS — R16.0 LIVER MASS: ICD-10-CM

## 2019-06-27 PROCEDURE — 25500064 ZZH RX 255 OP 636: Performed by: FAMILY MEDICINE

## 2019-06-27 PROCEDURE — A9585 GADOBUTROL INJECTION: HCPCS | Performed by: FAMILY MEDICINE

## 2019-06-27 PROCEDURE — 74183 MRI ABD W/O CNTR FLWD CNTR: CPT

## 2019-06-27 RX ORDER — GADOBUTROL 604.72 MG/ML
10 INJECTION INTRAVENOUS ONCE
Status: COMPLETED | OUTPATIENT
Start: 2019-06-27 | End: 2019-06-27

## 2019-06-27 RX ADMIN — GADOBUTROL 10 ML: 604.72 INJECTION INTRAVENOUS at 13:06

## 2019-06-27 NOTE — RESULT ENCOUNTER NOTE
Raheem Santamaria,  Your abdominal MRI shows stability (actually there's a slight decrease in size) of the liver mass.   Dr. Wegener will review this as well when he returns to clinic.  Karoline Joel MD for Dr. Wegener

## 2019-07-06 DIAGNOSIS — R16.0 LIVER MASS: Primary | ICD-10-CM

## 2019-07-30 ENCOUNTER — MYC MEDICAL ADVICE (OUTPATIENT)
Dept: FAMILY MEDICINE | Facility: CLINIC | Age: 34
End: 2019-07-30

## 2019-07-30 NOTE — TELEPHONE ENCOUNTER
I would recommend seeing Dr. Mayen or Dr. Blum or another provider that does incision and drainage for evaluation.  Unfortunately right now I have a broken hand and would not be able to do it.  If possible I would schedule a 40-minute visit.    Joel Wegener,MD

## 2019-07-30 NOTE — TELEPHONE ENCOUNTER
Called patient and discussed provider message - schedule him tomorrow 8/1/19 - with Dr. Mayen    Patient agrees with plan    Blanca Nguyen RN

## 2019-07-30 NOTE — TELEPHONE ENCOUNTER
Dr Wegener - Please advise.  Should patient be seen before August 12th?  Should patient scheduled with another provider if he wants/needs D&C?  Haylie Palomares RN

## 2019-08-01 ENCOUNTER — OFFICE VISIT (OUTPATIENT)
Dept: FAMILY MEDICINE | Facility: CLINIC | Age: 34
End: 2019-08-01
Payer: COMMERCIAL

## 2019-08-01 DIAGNOSIS — L72.3 INFLAMED EPIDERMOID CYST OF SKIN: Primary | ICD-10-CM

## 2019-08-01 PROCEDURE — 87070 CULTURE OTHR SPECIMN AEROBIC: CPT | Performed by: FAMILY MEDICINE

## 2019-08-01 PROCEDURE — 10060 I&D ABSCESS SIMPLE/SINGLE: CPT | Performed by: FAMILY MEDICINE

## 2019-08-01 ASSESSMENT — ANXIETY QUESTIONNAIRES
GAD7 TOTAL SCORE: 11
6. BECOMING EASILY ANNOYED OR IRRITABLE: SEVERAL DAYS
2. NOT BEING ABLE TO STOP OR CONTROL WORRYING: MORE THAN HALF THE DAYS
GAD7 TOTAL SCORE: 11
7. FEELING AFRAID AS IF SOMETHING AWFUL MIGHT HAPPEN: MORE THAN HALF THE DAYS
3. WORRYING TOO MUCH ABOUT DIFFERENT THINGS: MORE THAN HALF THE DAYS
4. TROUBLE RELAXING: SEVERAL DAYS
GAD7 TOTAL SCORE: 11
1. FEELING NERVOUS, ANXIOUS, OR ON EDGE: MORE THAN HALF THE DAYS
7. FEELING AFRAID AS IF SOMETHING AWFUL MIGHT HAPPEN: MORE THAN HALF THE DAYS
5. BEING SO RESTLESS THAT IT IS HARD TO SIT STILL: SEVERAL DAYS

## 2019-08-01 ASSESSMENT — PATIENT HEALTH QUESTIONNAIRE - PHQ9
10. IF YOU CHECKED OFF ANY PROBLEMS, HOW DIFFICULT HAVE THESE PROBLEMS MADE IT FOR YOU TO DO YOUR WORK, TAKE CARE OF THINGS AT HOME, OR GET ALONG WITH OTHER PEOPLE: NOT DIFFICULT AT ALL
SUM OF ALL RESPONSES TO PHQ QUESTIONS 1-9: 0
SUM OF ALL RESPONSES TO PHQ QUESTIONS 1-9: 0

## 2019-08-01 NOTE — PATIENT INSTRUCTIONS
Patient Education   Return tomorrow at 1:40 with Dr. Mayen      Patient Education     Wound Care After Packing Removal or Replacement  Packing is a special type of dressing placed inside some wounds to help them heal. Once the packing is removed, you need to care for your wound. Good wound care helps prevent infection. Be sure to keep all follow-up appointments with your healthcare provider. Follow these instructions to take care of the wound once you re at home.  Home care  Your healthcare provider may prescribe medicines for pain. Or he or she may suggest an over-the-counter (OTC) pain reliever, such as ibuprofen or acetaminophen. Talk with your healthcare provider before taking any OTC medicines if you have chronic liver or kidney disease, a stomach ulcer, or gastrointestinal bleeding. In certain cases, you may also need to take antibiotics to help prevent infection. If so, take them exactly as directed for as long as directed. Don t stop taking your antibiotics until they are all gone, even if you feel better.  Here are some general care guidelines for your wound:    Follow your healthcare provider s instructions on how to care for your wound. Always wash your hands with soap and warm water before and after tending to your wound.    If a bandage was put on, remove and change it once a day or as directed. If the bandage gets wet or dirty, replace it as soon as possible with a new bandage. Use a clean cloth to gently pat the wound dry.    If your packing was replaced, a small piece of gauze may hang from the wound. It allows fluid, blood, and possibly pus to continue draining from the wound. You may need to use an ointment or cream to keep the packing from sticking to the bandage.    Don't bathe in a tub or soak your wound until your healthcare provider says it s OK. Take showers or sponge baths instead. Don't swim.    Don t scratch, rub, scrub, or pick your wound.    Check your wound daily for the signs of  infection listed below.  If your wound was closed, it was likely with one of four types of closures. These include stitches (sutures), strips of surgical tape, skin glue, and staples. Your healthcare provider will decide on the best closure based on the size and location of your wound. Each type of closure needs specific care.    Sutures. You may want to clean the wound daily after the first 2 to 3 days. To do this, remove the bandage and gently wash the area with soap and warm water. After cleaning, apply a thin layer of antibiotic ointment if recommended. Then put on a new bandage. Sutures on the outside of the skin usually need to be removed by your healthcare provider.    Surgical tape. Keep the area dry. If it gets wet, blot it dry with a towel. Surgical tape closures usually fall off within 7 to 10 days. If they have not fallen off after 10 days, you can remove them yourself. To remove the tape, use mineral oil or petroleum jelly on a cotton ball to gently rub the adhesive.    Skin glue. You may shower or bathe as usual. But don't use soaps, lotions, or ointments on the wound area. Don't scrub the wound. After bathing, pat the wound dry with a soft towel. Don't apply liquids like peroxide, ointments, or creams to the wound while the strips or film is in place. Don't scratch, rub, or pick at the strips or film. Don't put tape directly over the strips or film. Skin adhesive film will fall off naturally in 5 to 10 days. If it does not peel off in 10 days, gently rub petroleum jelly or an ointment onto the film.    [place]. Take showers or sponge baths. Don't take tub baths. Don't use lotions on the wound area. The area may be cleaned with soap and water 2 to 3 days after the wound was stapled. Don't scrub the wound. Pat it dry with a clean soft cloth or towel. You can use antibiotic ointment if your healthcare provider tells you to. Staples will need to be removed in 10 to 14 days.  Follow-up care  Follow up with  your healthcare provider, or as directed. If your packing was replaced, you may need another visit within 1 to 3 days to remove or replace it. If you have sutures or staples, return for their removal as directed.  When to seek medical advice  Call your healthcare provider right away if you have signs of infection:    Fever of 100.4  F (38 C) or higher, or as directed by your healthcare provider    Increasing pain in the wound or pain that doesn t get better even with pain medicine    Increasing redness or swelling    Pus or bad-smelling drainage from the wound. This can be normal for an abscess that has been opened or packed. This should not occur in other types of wounds.  Also call your healthcare provider right away if any of these occur:    Your wound bleeds more than a small amount or won t stop bleeding.    You have numbness or weakness in the wound area that doesn t go away.   Date Last Reviewed: 5/1/2018 2000-2018 [employer]. [address] [place] [address]. All rights reserved. This information is not intended as a substitute for professional medical care. Always follow your healthcare professional's instructions.

## 2019-08-01 NOTE — PROGRESS NOTES
Subjective     Rosalio Odonnell is a 34 year old male who presents to clinic today for the following health issues:    HPI   Cyst       Duration: cyst appeared around November 2018. The last 2 weeks pt has noticed discomfort, redness, pain and swelling.    Sent photo to Dr. Wegener on 12/31/2018 through [employer] and was told to monitor area for infection    Description  Location: possible infected cyst on upper back   Itching: no: feels like a sun burn.     Intensity:  Moderate: more painful in the past week     Accompanying signs and symptoms: painful to the touch     History (similar episodes/previous evaluation): None    Therapies tried and outcome: aloe vera 2 nights ago did not help       No fever    OBJECTIVE: BP (!) (P) 156/80   Pulse (P) 76   Temp (P) 98.2  F (36.8  C) (Oral)   Wt (P) 94.8 kg (209 lb)   SpO2 (P) 99%   BMI (P) 29.15 kg/m     apparent cyst upper back with about 2 cm of surrounding induration. In the center there is fluctuance. Erythematous as well. Not draining.       ICD-10-CM    1. Inflamed epidermoid cyst of skin L72.3 Wound Culture Aerobic Bacterial    After informed verbal consent was obtained, risks, alternatives discussed, using Betadine for cleansing and 1% Lidocaine with epinephrine for anesthetic, with sterile technique a 11 blade was used to incise the lesion the lesion and hemostat used to explore and to break up adhesions. Result of procedure was purulent drainage. Wick placed. Antibiotic dressing is applied, and wound care instructions provided. Be alert for any signs of worsening cutaneous infection. The specimen is labeled and sent to pathology for evaluation. The procedure was well tolerated without complications. Wound cares discussed.

## 2019-08-02 ENCOUNTER — OFFICE VISIT (OUTPATIENT)
Dept: FAMILY MEDICINE | Facility: CLINIC | Age: 34
End: 2019-08-02
Payer: COMMERCIAL

## 2019-08-02 VITALS
HEIGHT: 71 IN | RESPIRATION RATE: 16 BRPM | TEMPERATURE: 98.4 F | DIASTOLIC BLOOD PRESSURE: 72 MMHG | SYSTOLIC BLOOD PRESSURE: 131 MMHG | HEART RATE: 60 BPM | BODY MASS INDEX: 29.37 KG/M2 | WEIGHT: 209.8 LBS | OXYGEN SATURATION: 100 %

## 2019-08-02 DIAGNOSIS — L72.3 INFLAMED SEBACEOUS CYST: Primary | ICD-10-CM

## 2019-08-02 PROCEDURE — 99024 POSTOP FOLLOW-UP VISIT: CPT | Performed by: FAMILY MEDICINE

## 2019-08-02 ASSESSMENT — PATIENT HEALTH QUESTIONNAIRE - PHQ9: SUM OF ALL RESPONSES TO PHQ QUESTIONS 1-9: 0

## 2019-08-02 ASSESSMENT — ANXIETY QUESTIONNAIRES: GAD7 TOTAL SCORE: 11

## 2019-08-02 ASSESSMENT — MIFFLIN-ST. JEOR: SCORE: 1923.78

## 2019-08-02 NOTE — PROGRESS NOTES
"Subjective     Here for wound cares.     OBJECTIVE: /72 (BP Location: Left arm, Patient Position: Sitting, Cuff Size: Adult Regular)   Pulse 60   Temp 98.4  F (36.9  C) (Oral)   Resp 16   Ht 1.803 m (5' 11\")   Wt 95.2 kg (209 lb 12.8 oz)   SpO2 100%   BMI 29.26 kg/m   no apparent distress   Wound with decreased erythema and less swelling     It was closed over the packing looked to have fallen out.     With manipulation the wound reopened and some purulent drainage occurred.       ICD-10-CM    1. Inflamed sebaceous cyst L72.3     Wound cares discussed. Keep packing in for 36 hours then can remove. follow up with any questions.   "

## 2019-08-03 LAB
BACTERIA SPEC CULT: NORMAL
Lab: NORMAL
SPECIMEN SOURCE: NORMAL

## 2019-09-27 ENCOUNTER — OFFICE VISIT (OUTPATIENT)
Dept: FAMILY MEDICINE | Facility: CLINIC | Age: 34
End: 2019-09-27
Payer: COMMERCIAL

## 2019-09-27 ENCOUNTER — HOSPITAL ENCOUNTER (OUTPATIENT)
Dept: MRI IMAGING | Facility: CLINIC | Age: 34
Discharge: HOME | End: 2019-09-27
Attending: FAMILY MEDICINE | Admitting: FAMILY MEDICINE
Payer: COMMERCIAL

## 2019-09-27 VITALS
BODY MASS INDEX: 29.61 KG/M2 | OXYGEN SATURATION: 99 % | TEMPERATURE: 98.1 F | DIASTOLIC BLOOD PRESSURE: 80 MMHG | RESPIRATION RATE: 16 BRPM | HEIGHT: 71 IN | SYSTOLIC BLOOD PRESSURE: 110 MMHG | HEART RATE: 79 BPM | WEIGHT: 211.5 LBS

## 2019-09-27 DIAGNOSIS — R16.0 LIVER MASS: ICD-10-CM

## 2019-09-27 DIAGNOSIS — Z23 NEED FOR INFLUENZA VACCINATION: ICD-10-CM

## 2019-09-27 DIAGNOSIS — Z11.4 SCREENING FOR HIV (HUMAN IMMUNODEFICIENCY VIRUS): ICD-10-CM

## 2019-09-27 DIAGNOSIS — Z00.00 ROUTINE GENERAL MEDICAL EXAMINATION AT A HEALTH CARE FACILITY: Primary | ICD-10-CM

## 2019-09-27 DIAGNOSIS — K76.89 FOCAL NODULAR HYPERPLASIA OF LIVER: ICD-10-CM

## 2019-09-27 DIAGNOSIS — Z13.6 CARDIOVASCULAR SCREENING; LDL GOAL LESS THAN 160: ICD-10-CM

## 2019-09-27 PROCEDURE — 90686 IIV4 VACC NO PRSV 0.5 ML IM: CPT | Performed by: FAMILY MEDICINE

## 2019-09-27 PROCEDURE — 36415 COLL VENOUS BLD VENIPUNCTURE: CPT | Performed by: FAMILY MEDICINE

## 2019-09-27 PROCEDURE — 99395 PREV VISIT EST AGE 18-39: CPT | Mod: 25 | Performed by: FAMILY MEDICINE

## 2019-09-27 PROCEDURE — 25500064 ZZH RX 255 OP 636: Performed by: FAMILY MEDICINE

## 2019-09-27 PROCEDURE — 80061 LIPID PANEL: CPT | Performed by: FAMILY MEDICINE

## 2019-09-27 PROCEDURE — 74183 MRI ABD W/O CNTR FLWD CNTR: CPT

## 2019-09-27 PROCEDURE — A9581 GADOXETATE DISODIUM INJ: HCPCS | Performed by: FAMILY MEDICINE

## 2019-09-27 PROCEDURE — 87389 HIV-1 AG W/HIV-1&-2 AB AG IA: CPT | Performed by: FAMILY MEDICINE

## 2019-09-27 PROCEDURE — 90471 IMMUNIZATION ADMIN: CPT | Performed by: FAMILY MEDICINE

## 2019-09-27 RX ORDER — GADOBUTROL 604.72 MG/ML
10 INJECTION INTRAVENOUS ONCE
Status: DISCONTINUED | OUTPATIENT
Start: 2019-09-27 | End: 2019-09-27 | Stop reason: CLARIF

## 2019-09-27 RX ADMIN — GADOXETATE DISODIUM 10 ML: 181.43 INJECTION, SOLUTION INTRAVENOUS at 07:59

## 2019-09-27 ASSESSMENT — MIFFLIN-ST. JEOR: SCORE: 1931.49

## 2019-09-27 NOTE — PROGRESS NOTES
SUBJECTIVE:   CC: Rosalio Odonnell is an 34 year old male who presents for preventive health visit.     Healthy Habits:    Do you get at least three servings of calcium containing foods daily (dairy, green leafy vegetables, etc.)? yes    Amount of exercise or daily activities, outside of work: 6-7 day(s) per week    Problems taking medications regularly No    Medication side effects: No    Have you had an eye exam in the past two years? yes    Do you see a dentist twice per year? yes    Do you have sleep apnea, excessive snoring or daytime drowsiness?no      PROBLEMS TO ADD ON...  -------------------------------------  Overall doing well. Earlier in summer had some left chest wall pain with running but after taking a break would seem to loosen up and then get better.     Wanting to follow up mri results from this morning.     Today's PHQ-2 Score:   PHQ-2 ( 1999 [employer]) 8/2/2019 4/9/2019   Q1: Little interest or pleasure in doing things 0 0   Q2: Feeling down, depressed or hopeless 0 0   PHQ-2 Score 0 0   Q1: Little interest or pleasure in doing things - -   Q2: Feeling down, depressed or hopeless - -   PHQ-2 Score - -       Abuse: Current or Past(Physical, Sexual or Emotional)- No  Do you feel safe in your environment? Yes    Social History     Tobacco Use     Smoking status: Never Smoker     Smokeless tobacco: Never Used   Substance Use Topics     Alcohol use: Yes     Comment: rare     If you drink alcohol do you typically have >3 drinks per day or >7 drinks per week? No                      Last PSA: No results found for: PSA    Reviewed orders with patient. Reviewed health maintenance and updated orders accordingly - Yes  Lab work is in process    Reviewed and updated as needed this visit by clinical staff  Tobacco  Meds  Med Hx  Surg Hx  Fam Hx  Soc Hx        Reviewed and updated as needed this visit by Provider        History reviewed. No pertinent past medical history.   History reviewed. No pertinent  "surgical history.    ROS:  CONSTITUTIONAL: NEGATIVE for fever, chills, change in weight  INTEGUMENTARY/SKIN: NEGATIVE for worrisome rashes, moles or lesions  EYES: NEGATIVE for vision changes or irritation  ENT: NEGATIVE for ear, mouth and throat problems  RESP: NEGATIVE for significant cough or SOB  CV: NEGATIVE for chest pain, palpitations or peripheral edema  GI: NEGATIVE for nausea, abdominal pain, heartburn, or change in bowel habits   male: negative for dysuria, hematuria, decreased urinary stream, erectile dysfunction, urethral discharge  MUSCULOSKELETAL: NEGATIVE for significant arthralgias or myalgia  NEURO: NEGATIVE for weakness, dizziness or paresthesias  PSYCHIATRIC: NEGATIVE for changes in mood or affect    OBJECTIVE:   /80 (BP Location: Left arm, Patient Position: Sitting, Cuff Size: Adult Regular)   Pulse 79   Temp 98.1  F (36.7  C) (Oral)   Resp 16   Ht 1.803 m (5' 11\")   Wt 95.9 kg (211 lb 8 oz)   SpO2 99%   BMI 29.50 kg/m    EXAM:  GENERAL: healthy, alert and no distress  EYES: Eyes grossly normal to inspection, PERRL and conjunctivae and sclerae normal  HENT: ear canals and TM's normal, nose and mouth without ulcers or lesions  NECK: no adenopathy, no asymmetry, masses, or scars and thyroid normal to palpation  RESP: lungs clear to auscultation - no rales, rhonchi or wheezes  CV: regular rate and rhythm, normal S1 S2, no S3 or S4, no murmur, click or rub, no peripheral edema and peripheral pulses strong  ABDOMEN: soft, nontender, no hepatosplenomegaly, no masses and bowel sounds normal  MS: no gross musculoskeletal defects noted, no edema  SKIN: no suspicious lesions or rashes  NEURO: Normal strength and tone, mentation intact and speech normal  PSYCH: mentation appears normal, affect normal/bright        ASSESSMENT/PLAN:   1. Routine general medical examination at a health care facility  Flu shot. Discussed exercise/weight/diet. Ok to do keto for a bit -lost 30 lbs already.. " "unclear if keto diet would help/hurt cholesterol if it is producing weight loss but this would not be long-term.    2. Focal nodular hyperplasia of liver  Reassurance, benign, not progressive, no need for further imaging.    3. CARDIOVASCULAR SCREENING; LDL GOAL LESS THAN 160    - Lipid panel reflex to direct LDL Fasting    4. Screening for HIV (human immunodeficiency virus)    - HIV Antigen Antibody Combo    5. Need for influenza vaccination        COUNSELING:  Reviewed preventive health counseling, as reflected in patient instructions       Regular exercise       Healthy diet/nutrition       Colon cancer screening       Prostate cancer screening    Estimated body mass index is 29.5 kg/m  as calculated from the following:    Height as of this encounter: 1.803 m (5' 11\").    Weight as of this encounter: 95.9 kg (211 lb 8 oz).    Weight management plan: Discussed healthy diet and exercise guidelines     reports that he has never smoked. He has never used smokeless tobacco.      Counseling Resources:  ATP IV Guidelines  Pooled Cohorts Equation Calculator  FRAX Risk Assessment  ICSI Preventive Guidelines  Dietary Guidelines for Americans, 2010  USDA's MyPlate  ASA Prophylaxis  Lung CA Screening    Joel Daniel Wegener, MD  [facility]  "

## 2019-09-28 LAB
CHOLEST SERPL-MCNC: 154 MG/DL
HDLC SERPL-MCNC: 58 MG/DL
LDLC SERPL CALC-MCNC: 80 MG/DL
NONHDLC SERPL-MCNC: 96 MG/DL
TRIGL SERPL-MCNC: 80 MG/DL

## 2019-09-30 LAB — HIV 1+2 AB+HIV1 P24 AG SERPL QL IA: NONREACTIVE

## 2020-10-11 DIAGNOSIS — F51.01 PRIMARY INSOMNIA: ICD-10-CM

## 2020-10-11 RX ORDER — TRAZODONE HYDROCHLORIDE 50 MG/1
50 TABLET, FILM COATED ORAL
Qty: 30 TABLET | Refills: 0 | Status: SHIPPED | OUTPATIENT
Start: 2020-10-11 | End: 2020-11-05

## 2020-11-03 DIAGNOSIS — F51.01 PRIMARY INSOMNIA: ICD-10-CM

## 2020-11-03 NOTE — TELEPHONE ENCOUNTER
Routing refill request to provider for review/approval because:  Shona given x1 and patient did not follow up, please advise

## 2020-11-05 RX ORDER — TRAZODONE HYDROCHLORIDE 50 MG/1
50 TABLET, FILM COATED ORAL
Qty: 30 TABLET | Refills: 0 | Status: SHIPPED | OUTPATIENT
Start: 2020-11-05 | End: 2020-12-08

## 2020-12-14 ENCOUNTER — OFFICE VISIT (OUTPATIENT)
Dept: FAMILY MEDICINE | Facility: CLINIC | Age: 35
End: 2020-12-14
Payer: COMMERCIAL

## 2020-12-14 VITALS
OXYGEN SATURATION: 95 % | RESPIRATION RATE: 16 BRPM | TEMPERATURE: 98.5 F | SYSTOLIC BLOOD PRESSURE: 135 MMHG | HEIGHT: 71 IN | WEIGHT: 240.8 LBS | HEART RATE: 78 BPM | DIASTOLIC BLOOD PRESSURE: 83 MMHG | BODY MASS INDEX: 33.71 KG/M2

## 2020-12-14 DIAGNOSIS — Z13.1 SCREENING FOR DIABETES MELLITUS: ICD-10-CM

## 2020-12-14 DIAGNOSIS — Z00.00 ROUTINE GENERAL MEDICAL EXAMINATION AT A HEALTH CARE FACILITY: Primary | ICD-10-CM

## 2020-12-14 DIAGNOSIS — R63.5 WEIGHT GAIN: ICD-10-CM

## 2020-12-14 DIAGNOSIS — Z13.6 CARDIOVASCULAR SCREENING; LDL GOAL LESS THAN 160: ICD-10-CM

## 2020-12-14 DIAGNOSIS — E66.09 CLASS 1 OBESITY DUE TO EXCESS CALORIES WITHOUT SERIOUS COMORBIDITY WITH BODY MASS INDEX (BMI) OF 33.0 TO 33.9 IN ADULT: ICD-10-CM

## 2020-12-14 DIAGNOSIS — F51.01 PRIMARY INSOMNIA: ICD-10-CM

## 2020-12-14 DIAGNOSIS — E66.811 CLASS 1 OBESITY DUE TO EXCESS CALORIES WITHOUT SERIOUS COMORBIDITY WITH BODY MASS INDEX (BMI) OF 33.0 TO 33.9 IN ADULT: ICD-10-CM

## 2020-12-14 LAB
CHOLEST SERPL-MCNC: 175 MG/DL
GLUCOSE SERPL-MCNC: 118 MG/DL (ref 70–99)
HDLC SERPL-MCNC: 55 MG/DL
LDLC SERPL CALC-MCNC: 97 MG/DL
NONHDLC SERPL-MCNC: 120 MG/DL
TRIGL SERPL-MCNC: 113 MG/DL
TSH SERPL DL<=0.005 MIU/L-ACNC: 2.97 MU/L (ref 0.4–4)

## 2020-12-14 PROCEDURE — 80061 LIPID PANEL: CPT | Performed by: FAMILY MEDICINE

## 2020-12-14 PROCEDURE — 84443 ASSAY THYROID STIM HORMONE: CPT | Performed by: FAMILY MEDICINE

## 2020-12-14 PROCEDURE — 82947 ASSAY GLUCOSE BLOOD QUANT: CPT | Performed by: FAMILY MEDICINE

## 2020-12-14 PROCEDURE — 99395 PREV VISIT EST AGE 18-39: CPT | Performed by: FAMILY MEDICINE

## 2020-12-14 PROCEDURE — 36415 COLL VENOUS BLD VENIPUNCTURE: CPT | Performed by: FAMILY MEDICINE

## 2020-12-14 RX ORDER — TRAZODONE HYDROCHLORIDE 50 MG/1
50 TABLET, FILM COATED ORAL
Qty: 90 TABLET | Refills: 3 | Status: SHIPPED | OUTPATIENT
Start: 2020-12-14 | End: 2021-12-29

## 2020-12-14 ASSESSMENT — MIFFLIN-ST. JEOR: SCORE: 2059.39

## 2020-12-14 NOTE — NURSING NOTE
"Chief Complaint   Patient presents with     Physical     /83   Pulse 78   Temp 98.5  F (36.9  C) (Tympanic)   Resp 16   Ht 1.803 m (5' 11\")   Wt 109.2 kg (240 lb 12.8 oz)   SpO2 95%   BMI 33.58 kg/m   Estimated body mass index is 33.58 kg/m  as calculated from the following:    Height as of this encounter: 1.803 m (5' 11\").    Weight as of this encounter: 109.2 kg (240 lb 12.8 oz).  Medication Reconciliation: complete        Health Maintenance Due Pending Provider Review:  NONE    n/a    Mira Harris MA  [facility]  [phone]  "

## 2020-12-14 NOTE — PROGRESS NOTES
SUBJECTIVE:   CC: Rosalio Odonnell is an 35 year old male who presents for preventative health visit.       Patient has been advised of split billing requirements and indicates understanding: Yes  Healthy Habits:     Getting at least 3 servings of Calcium per day:  Yes    Bi-annual eye exam:  Yes    Dental care twice a year:  Yes    Sleep apnea or symptoms of sleep apnea:  None    Diet:  Regular (no restrictions)    Frequency of exercise:  4-5 days/week    Duration of exercise:  15-30 minutes    Taking medications regularly:  Yes    Medication side effects:  None    PHQ-2 Total Score: 0    Additional concerns today:  Yes          PROBLEMS TO ADD ON...  -------------------------------------  Mentally overall well/not depressed or anxious but struggling working at home only with boredome, concentration, general enjoyment, activity.  Eating more, more treats, carbs, etc, less activity  Gained weight.      Today's PHQ-2 Score:   PHQ-2 ( 1999 [employer]) 12/14/2020   Q1: Little interest or pleasure in doing things 0   Q2: Feeling down, depressed or hopeless 0   PHQ-2 Score 0   Q1: Little interest or pleasure in doing things Not at all   Q2: Feeling down, depressed or hopeless Not at all   PHQ-2 Score 0       Abuse: Current or Past(Physical, Sexual or Emotional)- No  Do you feel safe in your environment? Yes        Social History     Tobacco Use     Smoking status: Never Smoker     Smokeless tobacco: Never Used   Substance Use Topics     Alcohol use: Yes     Comment: rare     If you drink alcohol do you typically have >3 drinks per day or >7 drinks per week? No    Alcohol Use 12/14/2020   Prescreen: >3 drinks/day or >7 drinks/week? No   Prescreen: >3 drinks/day or >7 drinks/week? -   No flowsheet data found.    Last PSA: No results found for: PSA    Reviewed orders with patient. Reviewed health maintenance and updated orders accordingly - Yes      Reviewed and updated as needed this visit by clinical staff  Tobacco  Allergies  " Meds  Problems  Med Hx  Surg Hx  Fam Hx          Reviewed and updated as needed this visit by Provider                History reviewed. No pertinent past medical history.   History reviewed. No pertinent surgical history.    Review of Systems  CONSTITUTIONAL: NEGATIVE for fever, chills, change in weight  INTEGUMENTARY/SKIN: NEGATIVE for worrisome rashes, moles or lesions  EYES: NEGATIVE for vision changes or irritation  ENT: NEGATIVE for ear, mouth and throat problems  RESP: NEGATIVE for significant cough or SOB  CV: NEGATIVE for chest pain, palpitations or peripheral edema  GI: NEGATIVE for nausea, abdominal pain, heartburn, or change in bowel habits   male: negative for dysuria, hematuria, decreased urinary stream, erectile dysfunction, urethral discharge  MUSCULOSKELETAL: NEGATIVE for significant arthralgias or myalgia  NEURO: NEGATIVE for weakness, dizziness or paresthesias  PSYCHIATRIC: NEGATIVE for changes in mood or affect    OBJECTIVE:   /83   Pulse 78   Temp 98.5  F (36.9  C) (Tympanic)   Resp 16   Ht 1.803 m (5' 11\")   Wt 109.2 kg (240 lb 12.8 oz)   SpO2 95%   BMI 33.58 kg/m      Physical Exam  GENERAL: healthy, alert and no distress  EYES: Eyes grossly normal to inspection, PERRL and conjunctivae and sclerae normal  HENT: ear canals and TM's normal, nose and mouth without ulcers or lesions  NECK: no adenopathy, no asymmetry, masses, or scars and thyroid normal to palpation  RESP: lungs clear to auscultation - no rales, rhonchi or wheezes  CV: regular rate and rhythm, normal S1 S2, no S3 or S4, no murmur, click or rub, no peripheral edema and peripheral pulses strong  ABDOMEN: soft, nontender, no hepatosplenomegaly, no masses and bowel sounds normal   (male): normal male genitalia without lesions or urethral discharge, no hernia  MS: no gross musculoskeletal defects noted, no edema  SKIN: no suspicious lesions or rashes  NEURO: Normal strength and tone, mentation intact and speech " "normal  PSYCH: mentation appears normal, affect normal/bright        ASSESSMENT/PLAN:   1. Routine general medical examination at a health care facility  Has had flu shot already.     Discussed weight gain. Unlikely to be related to low dose trazodone, although I recommend not taking this every night and to just use this as needed for insomnia.      Discussed activity strategies, diet strategies (use calorie couunter ffor awareness, high fiber snacks instead of carbs to curb appetite, etc).     Check tsh for hypothyroid, diabetes screen and lipids.     2. CARDIOVASCULAR SCREENING; LDL GOAL LESS THAN 160    - Lipid panel reflex to direct LDL Fasting    3. Class 1 obesity due to excess calories without serious comorbidity with body mass index (BMI) of 33.0 to 33.9 in adult    - Glucose  - TSH with free T4 reflex    4. Screening for diabetes mellitus    - Glucose    5. Weight gain    - TSH with free T4 reflex    Patient has been advised of split billing requirements and indicates understanding: Yes  COUNSELING:   Reviewed preventive health counseling, as reflected in patient instructions       Regular exercise       Healthy diet/nutrition    Estimated body mass index is 33.58 kg/m  as calculated from the following:    Height as of this encounter: 1.803 m (5' 11\").    Weight as of this encounter: 109.2 kg (240 lb 12.8 oz).     Weight management plan: Discussed healthy diet and exercise guidelines    He reports that he has never smoked. He has never used smokeless tobacco.      Counseling Resources:  ATP IV Guidelines  Pooled Cohorts Equation Calculator  FRAX Risk Assessment  ICSI Preventive Guidelines  Dietary Guidelines for Americans, 2010  USDA's MyPlate  ASA Prophylaxis  Lung CA Screening    Joel Daniel Wegener, MD  [facility]  "

## 2021-03-09 ENCOUNTER — OFFICE VISIT (OUTPATIENT)
Dept: FAMILY MEDICINE | Facility: CLINIC | Age: 36
End: 2021-03-09
Payer: COMMERCIAL

## 2021-03-09 VITALS
TEMPERATURE: 98.6 F | HEART RATE: 110 BPM | RESPIRATION RATE: 20 BRPM | OXYGEN SATURATION: 95 % | SYSTOLIC BLOOD PRESSURE: 138 MMHG | WEIGHT: 234.1 LBS | HEIGHT: 71 IN | DIASTOLIC BLOOD PRESSURE: 86 MMHG | BODY MASS INDEX: 32.77 KG/M2

## 2021-03-09 DIAGNOSIS — M54.2 NECK PAIN: Primary | ICD-10-CM

## 2021-03-09 DIAGNOSIS — R20.0 FINGER NUMBNESS: ICD-10-CM

## 2021-03-09 DIAGNOSIS — M62.838 NECK MUSCLE SPASM: ICD-10-CM

## 2021-03-09 PROCEDURE — 99214 OFFICE O/P EST MOD 30 MIN: CPT | Performed by: FAMILY MEDICINE

## 2021-03-09 RX ORDER — BACLOFEN 10 MG/1
10 TABLET ORAL
Qty: 7 TABLET | Refills: 0 | Status: SHIPPED | OUTPATIENT
Start: 2021-03-09 | End: 2021-03-16

## 2021-03-09 RX ORDER — PREDNISONE 20 MG/1
40 TABLET ORAL DAILY
Qty: 10 TABLET | Refills: 0 | Status: SHIPPED | OUTPATIENT
Start: 2021-03-09 | End: 2021-03-12

## 2021-03-09 ASSESSMENT — MIFFLIN-ST. JEOR: SCORE: 2029

## 2021-03-09 NOTE — PROGRESS NOTES
ASSESSMENT AND PLAN  1. Neck pain  Very likely from muscle spasm although disc herniation possible.     For now :  1.  Start prednisone in morning for 5 days.   2.  Use muscle relaxer (baclofen) at night before bed instead off the trazodone.   3.  Use warm packs on neck and then do the stretching  Exercises we discussed 3-4 times a day.     Let me know status in one week.  If not substantially improved would consider formal physical therapy and/or neck MRI.     - predniSONE (DELTASONE) 20 MG tablet; Take 2 tablets (40 mg) by mouth daily for 5 days  Dispense: 10 tablet; Refill: 0  - baclofen (LIORESAL) 10 MG tablet; Take 1 tablet (10 mg) by mouth nightly as needed for muscle spasms  Dispense: 7 tablet; Refill: 0    2. Neck muscle spasm    - predniSONE (DELTASONE) 20 MG tablet; Take 2 tablets (40 mg) by mouth daily for 5 days  Dispense: 10 tablet; Refill: 0  - baclofen (LIORESAL) 10 MG tablet; Take 1 tablet (10 mg) by mouth nightly as needed for muscle spasms  Dispense: 7 tablet; Refill: 0    3. Finger numbness    - predniSONE (DELTASONE) 20 MG tablet; Take 2 tablets (40 mg) by mouth daily for 5 days  Dispense: 10 tablet; Refill: 0  - baclofen (LIORESAL) 10 MG tablet; Take 1 tablet (10 mg) by mouth nightly as needed for muscle spasms  Dispense: 7 tablet; Refill: 0            Subjective   Rosalio is a 35 year old who presents for the following health issues   HPI       Back Pain  Onset/Duration: x1 week   Description:   Location of pain: upper back right  Character of pain: dull ache, stabbing and fullness  Pain radiation: into right elbow, some finger tingling   New numbness or weakness in legs, not attributed to pain: no   Intensity: at worst 8/10, moderate  Progression of Symptoms: worsening, also dull ache in right elbow   History:   Specific cause: none  Pain interferes with job:  no   History of back problems: no prior back problems  Any previous MRI or X-rays: None  Sees a specialist for back pain:  "No  Alleviating factors:   Improved by: NSAIDs    Precipitating factors:  Worsened by: Lying Flat  Therapies tried and outcome: heat, NSAIDs, sitting and standing - not helpful     Accompanying Signs & Symptoms:  Risk of Fracture: None  Risk of Cauda Equina: None  Risk of Infection: None  Risk of Cancer: None  Risk of Ankylosing Spondylitis: Onset at age <35, male, AND morning back stiffness no            Review of Systems         Objective    /86 (Patient Position: Sitting, Cuff Size: Adult Regular)   Pulse 110   Temp 98.6  F (37  C) (Tympanic)   Resp 20   Ht 5' 11\" (1.803 m)   Wt 234 lb 1.6 oz (106.2 kg)   SpO2 95%   BMI 32.65 kg/m    Body mass index is 32.65 kg/m .  Physical Exam   Shoulder abd/adduction 5/5  Elbow flexion/extension 5/5  Wrist flex/ext 5/5  Finger grasp 5/5    dtrs symmetric elbow/br bilaterally 2+               \plain      Joel Wegener,MD    "

## 2021-03-09 NOTE — PATIENT INSTRUCTIONS
"ASSESSMENT AND PLAN  1. Neck pain  Very likely from muscle spasm although disc herniation possible.     For now :  1.  Start prednisone in morning for 5 days.   2.  Use muscle relaxer (baclofen) at night before bed instead off the trazodone.   3.  Use warm packs on neck and then do the stretching  Exercises we discussed 3-4 times a day.     Let me know status in one week.  If not substantially improved would consider formal physical therapy and/or neck MRI.     - predniSONE (DELTASONE) 20 MG tablet; Take 2 tablets (40 mg) by mouth daily for 5 days  Dispense: 10 tablet; Refill: 0  - baclofen (LIORESAL) 10 MG tablet; Take 1 tablet (10 mg) by mouth nightly as needed for muscle spasms  Dispense: 7 tablet; Refill: 0    2. Neck muscle spasm    - predniSONE (DELTASONE) 20 MG tablet; Take 2 tablets (40 mg) by mouth daily for 5 days  Dispense: 10 tablet; Refill: 0  - baclofen (LIORESAL) 10 MG tablet; Take 1 tablet (10 mg) by mouth nightly as needed for muscle spasms  Dispense: 7 tablet; Refill: 0    3. Finger numbness    - predniSONE (DELTASONE) 20 MG tablet; Take 2 tablets (40 mg) by mouth daily for 5 days  Dispense: 10 tablet; Refill: 0  - baclofen (LIORESAL) 10 MG tablet; Take 1 tablet (10 mg) by mouth nightly as needed for muscle spasms  Dispense: 7 tablet; Refill: 0        [facility]: [email or web address] , [phone]    E-VISITS CAN BE DONE FOR CARE/PRESCRIPTIONS WHICH MAY NOT NEED AN IN-PERSON ASSESSMENT - click \"on-line care, then request e-visit\".      ONCARE VISIT/PRESCRIPTIONS: [email or web address]  - we treat nearly 50 common conditions with one hour response time     RADIOLOGY SCHEDULING  [facility]:  [phone]   [facility]: [phone]  [facility]: [phone]    Mammogram and Colonoscopy [phone]    Smoking Cessation: [email or web address], [phone] ([phone])    [facility] Scheduling:  [phone].    CONSUMER PRICE LINE for estimates of " test costs:  [phone]

## 2021-03-19 ENCOUNTER — THERAPY VISIT (OUTPATIENT)
Dept: PHYSICAL THERAPY | Facility: CLINIC | Age: 36
End: 2021-03-19
Payer: COMMERCIAL

## 2021-03-19 DIAGNOSIS — M54.2 NECK PAIN: ICD-10-CM

## 2021-03-19 PROCEDURE — 97110 THERAPEUTIC EXERCISES: CPT | Mod: GP | Performed by: PHYSICAL THERAPIST

## 2021-03-19 PROCEDURE — 97161 PT EVAL LOW COMPLEX 20 MIN: CPT | Mod: GP | Performed by: PHYSICAL THERAPIST

## 2021-03-19 PROCEDURE — 97112 NEUROMUSCULAR REEDUCATION: CPT | Mod: GP | Performed by: PHYSICAL THERAPIST

## 2021-03-19 NOTE — PROGRESS NOTES
[facility] Physical Therapy Initial Evaluation  3/19/2021     Precautions/Restrictions/MD instructions: eval and treat     Therapist Assessment: Rosalio Odonnell is a 35 year old male patient presenting to Physical Therapy with R sided neck pain with referred pain into R elbow, shoulder, and hand. Patient demonstrates decreased DNF endurance, + Spurlings on R, increased neural tension on R, and decreased mid and low trap strength. Signs and symptoms are consistent with R sided cervical radiculopathy. These impairments limit their ability to sleep without pain, and work at desk without pain. Skilled PT services are necessary in order to reduce impairments and improve independent function.    Subjective History    Injury/Condition Details:  Presenting Complaint Neck pain    Onset Timing/Date March 1st, (Doctor's referral 3/18/2021)   Mechanism Works at desk, low desk      Symptom Behavior Details    Primary Symptoms Constant symptoms; worsen with activity, pain (Location: R neck and elbow pain down to 2nd and 3rd finger, Quality: Sharp, Burning and Aching/Throbbing), stiffness      Denies locking, catching, giving way, or instability.      numbness, tingling, changes in sensation in 2nd R digit.     Denies having related symptoms spreading to the L upper trap, L upper arm, L forearm and L hand.    Worst Pain 8/10 (with working at desk)   Symptom Provocators Laying down, sleeping (pain at wrost after waking up), looking down    Best Pain 4/10    Symptom Relievers Prednisone, ibuprofen, whirlpool at gym, movement    Time of day dependent? Worse in morning   Recent symptom change? symptoms improving     Prior Testing/Intervention for current condition:  Prior Tests  None    Prior Treatment medication     Lifestyle & General Medical History:  Employment Accounting, work from    Usual physical activities  (within past year) [facility]    Orthopaedic History  See Epic Chart    Medication  Oxycodone, Vicodin,  baclofen, ibuprofen    Notable medical history See Epic Chart   Patient goals Decrease pain    Patient Reported Health good     Red Flags: (Bold when present) - reviewed the following and denies  Vertebrobasilar Artery   Symptom   Dysphagia Drop Attack   Dysarthria Dizziness   Diplopia Paresthesia of lips   Spinal Cord  Symptom   Bi/Quadriparesthesia Ataxia   Hemiparesthesia Urinary incontinence   Bi/Quadriparesis Fecal incontinence     Cranial Nerves - bold when abnormality is present  Cranial nerve   II-[place] VIII-Loss of Balance   III-Diplopia VIII-Hypoacousia   V-Facial paresthesia IX-Dysarthria   VII-Altered Taste IX-Dysphagia    X-Nausea       PHYSICAL THERAPY CERVICAL EXAMINATION    STATIC POSTURE  Forward head on neck, Increased thoracic kyphosis and Rounded shoulders    Cervical Spine ROM Screen   RIGHT LEFT   Cervical Spine ROM   Flexion Nil loss but painful at end range   Extension Nil loss   Rotation Nil loss Nil loss   Sidebend Nil loss Nil loss   Seated Thoracic Spine ROM   Rotation Nil loss Nil loss   Flexion Nil loss Nil loss   Extension Nil loss Nil loss     Passive Joint Mobility Screen: Increased stiffness with C3-C7 on R    Tender to palpation at the following structures: R upper trap  NOT tender to palpation at the following structures: L upper trap     SUSPECTED DIRECTIONAL PREFERENCE: extension      Upper Extremity Neural System Examination  Myotomes Strength (MMT)    Left Pain Right Pain   Resisted ABD (C5) 5/5 - 5/5 -   Resisted Elbow Flexion (C6)                 Wrist Extension 5/5 -  - 5/5 -  -   Resisted Elbow Extension (C7)                 Wrist Flexion 5/5   -   5/5   -     Resisted Thumb Extension (C8) 5/5 - 5/5 -   Resisted Intrinsics (T1) 5/5 - 5/5 -   Sensory/Reflex Tests: SILT and symmetrical for bilateral UE's.       Right Left   Upper Limb Tension Testing     Medial + -   Ulnar + -   Radial + -   - = normal  + = mild tightness  ++ = moderate tightness  +++ = significant  tightness      Upper Extremity Flexibility Screen:   Right Left   Pec Major ++ ++   Pec Minor ++ ++   Upper Trap +++ +   Levator Scapula ++ +   Scalenes - -   SubOccipital - -   Erector Spinae  - -   - = normal  + = mild tightness  ++ = moderate tightness  +++ = significant tightness    Static Tests:   Alar Ligament test: negative  Transverse Ligament Test:negative  Spurlings: + on R  Compression: negative   Distraction: positive for relief for cervical soreness     DNF endurance test: 12 seconds   Mid trap and low trap: 4-/5 bilaterally     ASSESSMENT/PLAN:  The patient is a 35 year old male with chief complaint of R cervical pain. Patient has increased neural tension on R side, significant forward head posture, and + Spurling's on R. Patient has difficulty with sleeping and working without pain.  The patient has the following significant findings with corresponding treatment plan.  Diagnosis 1:  Signs and symptoms consistent with R sided cervical radiculopathy     Pain -  hot/cold therapy, manual therapy, self management, education, directional preference exercise and home program  Decreased ROM/flexibility - manual therapy, therapeutic exercise and home program  Decreased joint mobility - manual therapy, therapeutic exercise and home program  Decreased strength - therapeutic exercise, therapeutic activities and home program  Impaired balance - neuro re-education, therapeutic activities and home program  Decreased proprioception - neuro re-education and therapeutic activities  Impaired gait - gait training and assistive devices  Impaired muscle performance - neuro re-education and home program  Decreased function - therapeutic activities and home program  Impaired posture - neuro re-education, therapeutic activities and home program  Instability -  Therapeutic Activity, Therapeutic Exercise, Neuromuscular Re-education, Splinting/Taping/Bracing/Orthotic, home program      Therapy Evaluation Codes:   1) History  comprised of:   Personal factors that impact the plan of care:      None.    Comorbidity factors that impact the plan of care are:      None.     Medications impacting care: Pain and Steroids.  2) Examination of Body Systems comprised of:   Body structures and functions that impact the plan of care:      Cervical spine, Elbow, Fingers and Hand.   Activity limitations that impact the plan of care are:      Reading/Computer work, Sleeping and Laying down.  3) Clinical presentation characteristics are:   Stable/Uncomplicated.  4) Decision-Making    Low complexity using standardized patient assessment instrument and/or measureable assessment of functional outcome.  Cumulative Therapy Evaluation is: Low complexity.    Previous and current functional limitations:  (See Goal Flow Sheet for this information)    Short term and Long term goals: (See Goal Flow Sheet for this information)     Communication ability:  Patient appears to be able to clearly communicate and understand verbal and written communication and follow directions correctly.  Treatment Explanation - The following has been discussed with the patient:   RX ordered/plan of care  Anticipated outcomes  Possible risks and side effects  This patient would benefit from PT intervention to resume normal activities.   Rehab potential is good.    Frequency:  1 X week, once daily  Duration:  for 8 visits  Discharge Plan: Achieve all LTGs, be independent in home treatment program, and reach maximal therapeutic benefit.    Please refer to the daily flowsheet for treatment today, total treatment time and time spent performing 1:1 timed codes.

## 2021-03-24 ENCOUNTER — IMMUNIZATION (OUTPATIENT)
Dept: NURSING | Facility: CLINIC | Age: 36
End: 2021-03-24
Payer: COMMERCIAL

## 2021-03-24 PROCEDURE — 0001A PR COVID VAC PFIZER DIL RECON 30 MCG/0.3 ML IM: CPT

## 2021-03-24 PROCEDURE — 91300 PR COVID VAC PFIZER DIL RECON 30 MCG/0.3 ML IM: CPT

## 2021-03-29 ENCOUNTER — THERAPY VISIT (OUTPATIENT)
Dept: PHYSICAL THERAPY | Facility: CLINIC | Age: 36
End: 2021-03-29
Payer: COMMERCIAL

## 2021-03-29 DIAGNOSIS — M54.2 NECK PAIN: ICD-10-CM

## 2021-03-29 PROCEDURE — 97140 MANUAL THERAPY 1/> REGIONS: CPT | Mod: GP | Performed by: PHYSICAL THERAPIST

## 2021-03-29 PROCEDURE — 97110 THERAPEUTIC EXERCISES: CPT | Mod: GP | Performed by: PHYSICAL THERAPIST

## 2021-03-29 PROCEDURE — 97530 THERAPEUTIC ACTIVITIES: CPT | Mod: GP | Performed by: PHYSICAL THERAPIST

## 2021-04-05 ENCOUNTER — THERAPY VISIT (OUTPATIENT)
Dept: PHYSICAL THERAPY | Facility: CLINIC | Age: 36
End: 2021-04-05
Payer: COMMERCIAL

## 2021-04-05 DIAGNOSIS — M54.2 NECK PAIN: ICD-10-CM

## 2021-04-05 PROCEDURE — 97530 THERAPEUTIC ACTIVITIES: CPT | Mod: GP | Performed by: PHYSICAL THERAPIST

## 2021-04-05 PROCEDURE — 97140 MANUAL THERAPY 1/> REGIONS: CPT | Mod: GP | Performed by: PHYSICAL THERAPIST

## 2021-04-05 PROCEDURE — 97110 THERAPEUTIC EXERCISES: CPT | Mod: GP | Performed by: PHYSICAL THERAPIST

## 2021-04-14 ENCOUNTER — OFFICE VISIT (OUTPATIENT)
Dept: NURSING | Facility: CLINIC | Age: 36
End: 2021-04-14
Attending: INTERNAL MEDICINE
Payer: COMMERCIAL

## 2021-04-14 PROCEDURE — 91300 PR COVID VAC PFIZER DIL RECON 30 MCG/0.3 ML IM: CPT

## 2021-04-14 PROCEDURE — 0002A PR COVID VAC PFIZER DIL RECON 30 MCG/0.3 ML IM: CPT

## 2021-04-22 ENCOUNTER — THERAPY VISIT (OUTPATIENT)
Dept: PHYSICAL THERAPY | Facility: CLINIC | Age: 36
End: 2021-04-22
Payer: COMMERCIAL

## 2021-04-22 DIAGNOSIS — M54.2 NECK PAIN: ICD-10-CM

## 2021-04-22 PROCEDURE — 97110 THERAPEUTIC EXERCISES: CPT | Mod: GP | Performed by: PHYSICAL THERAPIST

## 2021-04-22 PROCEDURE — 97530 THERAPEUTIC ACTIVITIES: CPT | Mod: GP | Performed by: PHYSICAL THERAPIST

## 2021-04-27 ENCOUNTER — THERAPY VISIT (OUTPATIENT)
Dept: PHYSICAL THERAPY | Facility: CLINIC | Age: 36
End: 2021-04-27
Payer: COMMERCIAL

## 2021-04-27 DIAGNOSIS — M54.2 NECK PAIN: ICD-10-CM

## 2021-04-27 PROCEDURE — 97110 THERAPEUTIC EXERCISES: CPT | Mod: GP | Performed by: PHYSICAL THERAPY ASSISTANT

## 2021-04-27 PROCEDURE — 97530 THERAPEUTIC ACTIVITIES: CPT | Mod: GP | Performed by: PHYSICAL THERAPY ASSISTANT

## 2021-04-27 PROCEDURE — 97140 MANUAL THERAPY 1/> REGIONS: CPT | Mod: GP | Performed by: PHYSICAL THERAPY ASSISTANT

## 2021-05-06 ENCOUNTER — THERAPY VISIT (OUTPATIENT)
Dept: PHYSICAL THERAPY | Facility: CLINIC | Age: 36
End: 2021-05-06
Payer: COMMERCIAL

## 2021-05-06 DIAGNOSIS — M54.2 NECK PAIN: ICD-10-CM

## 2021-05-06 PROCEDURE — 97112 NEUROMUSCULAR REEDUCATION: CPT | Mod: GP | Performed by: PHYSICAL THERAPIST

## 2021-05-06 PROCEDURE — 97140 MANUAL THERAPY 1/> REGIONS: CPT | Mod: GP | Performed by: PHYSICAL THERAPIST

## 2021-05-06 PROCEDURE — 97530 THERAPEUTIC ACTIVITIES: CPT | Mod: GP | Performed by: PHYSICAL THERAPIST

## 2021-05-24 ENCOUNTER — THERAPY VISIT (OUTPATIENT)
Dept: PHYSICAL THERAPY | Facility: CLINIC | Age: 36
End: 2021-05-24
Payer: COMMERCIAL

## 2021-05-24 DIAGNOSIS — M54.2 NECK PAIN: ICD-10-CM

## 2021-05-24 PROCEDURE — 97112 NEUROMUSCULAR REEDUCATION: CPT | Performed by: PHYSICAL THERAPY ASSISTANT

## 2021-05-24 PROCEDURE — 97530 THERAPEUTIC ACTIVITIES: CPT | Performed by: PHYSICAL THERAPY ASSISTANT

## 2021-10-02 ENCOUNTER — HEALTH MAINTENANCE LETTER (OUTPATIENT)
Age: 36
End: 2021-10-02

## 2021-10-13 PROBLEM — M54.2 NECK PAIN: Status: RESOLVED | Noted: 2021-03-19 | Resolved: 2021-10-13

## 2021-10-13 NOTE — PROGRESS NOTES
Patient did not return for further treatment and no additional progress was noted.  Please refer to the progress note and goal flowsheet completed on   for discharge information.

## 2022-01-22 ENCOUNTER — HEALTH MAINTENANCE LETTER (OUTPATIENT)
Age: 37
End: 2022-01-22

## 2022-07-20 ASSESSMENT — ENCOUNTER SYMPTOMS
NERVOUS/ANXIOUS: 0
CONSTIPATION: 0
DIARRHEA: 0
PARESTHESIAS: 0
EYE PAIN: 0
DIZZINESS: 0
MYALGIAS: 0
DYSURIA: 0
COUGH: 0
JOINT SWELLING: 0
HEARTBURN: 0
NAUSEA: 0
HEMATURIA: 0
ABDOMINAL PAIN: 0
CHILLS: 0
WEAKNESS: 0
ARTHRALGIAS: 0
HEMATOCHEZIA: 0
FREQUENCY: 0
FEVER: 0
HEADACHES: 0
SORE THROAT: 0
SHORTNESS OF BREATH: 0
PALPITATIONS: 0

## 2022-07-21 ENCOUNTER — OFFICE VISIT (OUTPATIENT)
Dept: FAMILY MEDICINE | Facility: CLINIC | Age: 37
End: 2022-07-21
Payer: COMMERCIAL

## 2022-07-21 VITALS
SYSTOLIC BLOOD PRESSURE: 129 MMHG | WEIGHT: 217.5 LBS | OXYGEN SATURATION: 97 % | RESPIRATION RATE: 13 BRPM | DIASTOLIC BLOOD PRESSURE: 70 MMHG | HEART RATE: 67 BPM | HEIGHT: 70 IN | BODY MASS INDEX: 31.14 KG/M2 | TEMPERATURE: 98.4 F

## 2022-07-21 DIAGNOSIS — Z13.1 SCREENING FOR DIABETES MELLITUS: ICD-10-CM

## 2022-07-21 DIAGNOSIS — Z13.6 CARDIOVASCULAR SCREENING; LDL GOAL LESS THAN 160: ICD-10-CM

## 2022-07-21 DIAGNOSIS — F51.01 PRIMARY INSOMNIA: ICD-10-CM

## 2022-07-21 DIAGNOSIS — Z00.00 ROUTINE GENERAL MEDICAL EXAMINATION AT A HEALTH CARE FACILITY: Primary | ICD-10-CM

## 2022-07-21 DIAGNOSIS — E66.811 OBESITY (BMI 30.0-34.9): ICD-10-CM

## 2022-07-21 PROCEDURE — 36415 COLL VENOUS BLD VENIPUNCTURE: CPT | Performed by: FAMILY MEDICINE

## 2022-07-21 PROCEDURE — 84443 ASSAY THYROID STIM HORMONE: CPT | Performed by: FAMILY MEDICINE

## 2022-07-21 PROCEDURE — 99395 PREV VISIT EST AGE 18-39: CPT | Performed by: FAMILY MEDICINE

## 2022-07-21 PROCEDURE — 82947 ASSAY GLUCOSE BLOOD QUANT: CPT | Performed by: FAMILY MEDICINE

## 2022-07-21 PROCEDURE — 80061 LIPID PANEL: CPT | Performed by: FAMILY MEDICINE

## 2022-07-21 RX ORDER — TRAZODONE HYDROCHLORIDE 50 MG/1
TABLET, FILM COATED ORAL
Qty: 90 TABLET | Refills: 3 | Status: SHIPPED | OUTPATIENT
Start: 2022-07-21 | End: 2023-07-24

## 2022-07-21 RX ORDER — TRAZODONE HYDROCHLORIDE 50 MG/1
TABLET, FILM COATED ORAL
Qty: 90 TABLET | Refills: 3 | Status: SHIPPED | OUTPATIENT
Start: 2022-07-21 | End: 2023-07-25

## 2022-07-21 RX ORDER — TRAZODONE HYDROCHLORIDE 50 MG/1
TABLET, FILM COATED ORAL
Qty: 90 TABLET | Refills: 3 | Status: SHIPPED | OUTPATIENT
Start: 2022-07-21 | End: 2022-07-21

## 2022-07-21 ASSESSMENT — ENCOUNTER SYMPTOMS
CHILLS: 0
WEAKNESS: 0
DYSURIA: 0
FEVER: 0
HEMATOCHEZIA: 0
DIARRHEA: 0
MYALGIAS: 0
SHORTNESS OF BREATH: 0
PALPITATIONS: 0
COUGH: 0
ABDOMINAL PAIN: 0
NERVOUS/ANXIOUS: 0
JOINT SWELLING: 0
ARTHRALGIAS: 0
CONSTIPATION: 0
EYE PAIN: 0
HEADACHES: 0
DIZZINESS: 0
SORE THROAT: 0
HEMATURIA: 0
FREQUENCY: 0
HEARTBURN: 0
NAUSEA: 0
PARESTHESIAS: 0

## 2022-07-21 NOTE — PROGRESS NOTES
SUBJECTIVE:   CC: Rosalio Odonnell is an 37 year old male who presents for preventative health visit.       Patient has been advised of split billing requirements and indicates understanding: Yes  Healthy Habits:     Getting at least 3 servings of Calcium per day:  Yes    Bi-annual eye exam:  Yes    Dental care twice a year:  Yes    Sleep apnea or symptoms of sleep apnea:  None    Diet:  Other    Frequency of exercise:  6-7 days/week    Duration of exercise:  Greater than 60 minutes    Taking medications regularly:  Yes    Medication side effects:  None    PHQ-2 Total Score: 0    Additional concerns today:  No              Today's PHQ-2 Score:   PHQ-2 ( 1999 [employer]) 7/20/2022   Q1: Little interest or pleasure in doing things 0   Q2: Feeling down, depressed or hopeless 0   PHQ-2 Score 0   PHQ-2 Total Score (12-17 Years)- Positive if 3 or more points; Administer PHQ-A if positive -   Q1: Little interest or pleasure in doing things Not at all   Q2: Feeling down, depressed or hopeless Not at all   PHQ-2 Score 0       Abuse: Current or Past(Physical, Sexual or Emotional)- No  Do you feel safe in your environment? Yes    Have you ever done Advance Care Planning? (For example, a Health Directive, POLST, or a discussion with a medical provider or your loved ones about your wishes): No, advance care planning information given to patient to review.  Patient declined advance care planning discussion at this time.    Social History     Tobacco Use     Smoking status: Never Smoker     Smokeless tobacco: Never Used   Substance Use Topics     Alcohol use: Yes     Comment: rare         Alcohol Use 7/20/2022   Prescreen: >3 drinks/day or >7 drinks/week? No   Prescreen: >3 drinks/day or >7 drinks/week? -       Last PSA: No results found for: PSA    Reviewed orders with patient. Reviewed health maintenance and updated orders accordingly - Yes  Lab work is in process    Reviewed and updated as needed this visit by clinical staff                     Reviewed and updated as needed this visit by Provider                   Past Medical History:   Diagnosis Date     Depressive disorder       No past surgical history on file.    Review of Systems   Constitutional: Negative for chills and fever.   HENT: Negative for congestion, ear pain, hearing loss and sore throat.    Eyes: Negative for pain and visual disturbance.   Respiratory: Negative for cough and shortness of breath.    Cardiovascular: Negative for chest pain, palpitations and peripheral edema.   Gastrointestinal: Negative for abdominal pain, constipation, diarrhea, heartburn, hematochezia and nausea.   Genitourinary: Negative for dysuria, frequency, genital sores, hematuria, impotence, penile discharge and urgency.   Musculoskeletal: Negative for arthralgias, joint swelling and myalgias.   Skin: Negative for rash.   Neurological: Negative for dizziness, weakness, headaches and paresthesias.   Psychiatric/Behavioral: Negative for mood changes. The patient is not nervous/anxious.          OBJECTIVE:   There were no vitals taken for this visit.    Physical Exam  GENERAL: healthy, alert and no distress  EYES: Eyes grossly normal to inspection, PERRL and conjunctivae and sclerae normal  NECK: no adenopathy, no asymmetry, masses, or scars and thyroid normal to palpation  RESP: lungs clear to auscultation - no rales, rhonchi or wheezes  CV: regular rate and rhythm, normal S1 S2, no S3 or S4, no murmur, click or rub, no peripheral edema and peripheral pulses strong  ABDOMEN: soft, nontender, no hepatosplenomegaly, no masses and bowel sounds normal   (male): normal male genitalia without lesions or urethral discharge, no hernia  MS: no gross musculoskeletal defects noted, no edema  SKIN: no suspicious lesions or rashes  NEURO: Normal strength and tone, mentation intact and speech normal  PSYCH: mentation appears normal, affect normal/bright        ASSESSMENT/PLAN:       ICD-10-CM    1. Routine general  "medical examination at a health care facility  Z00.00    2. CARDIOVASCULAR SCREENING; LDL GOAL LESS THAN 160  Z13.6 Lipid panel reflex to direct LDL Fasting     Lipid panel reflex to direct LDL Fasting   3. Obesity (BMI 30.0-34.9)  E66.9 TSH with free T4 reflex     TSH with free T4 reflex   4. Screening for diabetes mellitus  Z13.1 Glucose     Glucose   5. Primary insomnia  F51.01 traZODone (DESYREL) 50 MG tablet     traZODone (DESYREL) 50 MG tablet     DISCONTINUED: traZODone (DESYREL) 50 MG tablet       Patient has been advised of split billing requirements and indicates understanding: Yes    COUNSELING:   Reviewed preventive health counseling, as reflected in patient instructions       Regular exercise       Healthy diet/nutrition       HIV screeninx in teen years, 1x in adult years, and at intervals if high risk    Estimated body mass index is 32.65 kg/m  as calculated from the following:    Height as of 3/9/21: 1.803 m (5' 11\").    Weight as of 3/9/21: 106.2 kg (234 lb 1.6 oz).     Weight management plan: Discussed healthy diet and exercise guidelines    He reports that he has never smoked. He has never used smokeless tobacco.      Counseling Resources:  ATP IV Guidelines  Pooled Cohorts Equation Calculator  FRAX Risk Assessment  ICSI Preventive Guidelines  Dietary Guidelines for Americans, 2010  USDA's MyPlate  ASA Prophylaxis  Lung CA Screening    Joel Daniel Wegener, MD  [facility]  "

## 2022-07-22 LAB
CHOLEST SERPL-MCNC: 137 MG/DL
FASTING STATUS PATIENT QL REPORTED: NO
FASTING STATUS PATIENT QL REPORTED: NO
GLUCOSE BLD-MCNC: 91 MG/DL (ref 70–99)
HDLC SERPL-MCNC: 57 MG/DL
LDLC SERPL CALC-MCNC: 68 MG/DL
NONHDLC SERPL-MCNC: 80 MG/DL
TRIGL SERPL-MCNC: 61 MG/DL
TSH SERPL DL<=0.005 MIU/L-ACNC: 1.2 MU/L (ref 0.4–4)

## 2022-09-03 ENCOUNTER — HEALTH MAINTENANCE LETTER (OUTPATIENT)
Age: 37
End: 2022-09-03

## 2023-03-17 ENCOUNTER — OFFICE VISIT (OUTPATIENT)
Dept: URGENT CARE | Facility: URGENT CARE | Age: 38
End: 2023-03-17
Payer: COMMERCIAL

## 2023-03-17 VITALS
RESPIRATION RATE: 18 BRPM | HEART RATE: 58 BPM | SYSTOLIC BLOOD PRESSURE: 146 MMHG | DIASTOLIC BLOOD PRESSURE: 81 MMHG | BODY MASS INDEX: 28.03 KG/M2 | OXYGEN SATURATION: 100 % | WEIGHT: 192.6 LBS | TEMPERATURE: 98.9 F

## 2023-03-17 DIAGNOSIS — H61.23 BILATERAL IMPACTED CERUMEN: ICD-10-CM

## 2023-03-17 DIAGNOSIS — J45.990 EXERCISE-INDUCED ASTHMA: Primary | ICD-10-CM

## 2023-03-17 PROCEDURE — 69209 REMOVE IMPACTED EAR WAX UNI: CPT | Mod: 50 | Performed by: NURSE PRACTITIONER

## 2023-03-17 PROCEDURE — 99213 OFFICE O/P EST LOW 20 MIN: CPT | Mod: 25 | Performed by: NURSE PRACTITIONER

## 2023-03-17 RX ORDER — ALBUTEROL SULFATE 90 UG/1
2 AEROSOL, METERED RESPIRATORY (INHALATION) EVERY 6 HOURS PRN
Qty: 18 G | Refills: 0 | Status: SHIPPED | OUTPATIENT
Start: 2023-03-17

## 2023-03-17 NOTE — PROGRESS NOTES
Assessment & Plan     Exercise-induced asthma  Lungs clear at this time.  Refilled for future use as needed.    - albuterol (PROAIR HFA/PROVENTIL HFA/VENTOLIN HFA) 108 (90 Base) MCG/ACT inhaler  Dispense: 18 g; Refill: 0    Bilateral impacted cerumen  - REMOVE IMPACTED CERUMEN     Patient Instructions   The pathophysiology of asthma is explained. We've discussed the importance of compliance with medical regimen, and various treatment modalities such as beta agonists and inhaled steroids. The concepts of prophylactic and episodic or 'rescue' therapy has been discussed. The patient indicates understanding of these issues and knows when to call this office for help in treatment of asthma.    Cerumen removed by irrigation  by nursing staff.    Debrox recommended if wax returns.            Return in about 1 week (around 3/24/2023) for with regular provider if symptoms persist.    [facility]IE [facility]AS[facility] Santamaria is a 37 year old male who presents to clinic today for the following health issues:  Chief Complaint   Patient presents with     Ear Problem     Patient presents with complain of earwax in right ear which is interfering with his ability to hear. Complain of tinnitus. Been going on for the last few days.      Asthma     Patient also complain. Patient also complain of exercise induce asthma.        HPI    URI Adult    Onset of symptoms was 3 day(s) ago.  Course of illness is worsening.    Severity moderate  Current and Associated symptoms: ear pressure and SOB  Treatment measures tried include Inhaler (name: proair), Fluids and Rest.  Predisposing factors include HX of asthma.  Needs a new inhaler.      Has had wax in the past and tried debrox, but it's not helping.        Review of Systems  Constitutional, HEENT, cardiovascular, pulmonary, GI, , musculoskeletal, neuro, skin, endocrine and psych systems are negative, except as otherwise noted.       Objective    BP (!) 146/81 (BP Location: Left arm, Patient Position: Sitting, Cuff Size: Adult Regular)   Pulse 58   Temp 98.9  F (37.2  C) (Oral)   Resp 18   Wt 87.4 kg (192 lb 9.6 oz)   SpO2 100%   BMI 28.03 kg/m    Physical Exam   GENERAL: healthy, alert and no distress  EYES: Eyes grossly normal to inspection, PERRL and conjunctivae and sclerae normal  HENT: normal cephalic/atraumatic, right ear: occluded with wax, TM pearly grey and intact after irrigation.   left ear: normal: no effusions, no erythema, normal landmarks, nose and mouth without ulcers or lesions, oropharynx clear and oral mucous membranes moist  NECK: no adenopathy, no asymmetry, masses, or scars and thyroid normal to palpation  RESP: lungs clear to auscultation - no rales, rhonchi or wheezes  CV: regular rate and rhythm, normal S1 S2, no S3 or S4, no murmur, click or rub, no peripheral edema and peripheral pulses strong  MS: no gross musculoskeletal defects noted, no edema  SKIN: no suspicious lesions or rashes

## 2023-03-17 NOTE — PATIENT INSTRUCTIONS
The pathophysiology of asthma is explained. We've discussed the importance of compliance with medical regimen, and various treatment modalities such as beta agonists and inhaled steroids. The concepts of prophylactic and episodic or 'rescue' therapy has been discussed. The patient indicates understanding of these issues and knows when to call this office for help in treatment of asthma.    Cerumen removed by irrigation  by nursing staff.    Debrox recommended if wax returns.

## 2023-07-24 DIAGNOSIS — F51.01 PRIMARY INSOMNIA: ICD-10-CM

## 2023-07-25 RX ORDER — TRAZODONE HYDROCHLORIDE 50 MG/1
TABLET, FILM COATED ORAL
Qty: 30 TABLET | Refills: 0 | Status: SHIPPED | OUTPATIENT
Start: 2023-07-25 | End: 2023-08-31

## 2023-08-31 ENCOUNTER — OFFICE VISIT (OUTPATIENT)
Dept: FAMILY MEDICINE | Facility: CLINIC | Age: 38
End: 2023-08-31
Attending: FAMILY MEDICINE
Payer: COMMERCIAL

## 2023-08-31 VITALS
DIASTOLIC BLOOD PRESSURE: 69 MMHG | HEIGHT: 70 IN | TEMPERATURE: 97.5 F | RESPIRATION RATE: 16 BRPM | HEART RATE: 64 BPM | WEIGHT: 195 LBS | SYSTOLIC BLOOD PRESSURE: 129 MMHG | OXYGEN SATURATION: 98 % | BODY MASS INDEX: 27.92 KG/M2

## 2023-08-31 DIAGNOSIS — Z11.59 NEED FOR HEPATITIS C SCREENING TEST: ICD-10-CM

## 2023-08-31 DIAGNOSIS — E66.3 OVERWEIGHT (BMI 25.0-29.9): ICD-10-CM

## 2023-08-31 DIAGNOSIS — F51.01 PRIMARY INSOMNIA: ICD-10-CM

## 2023-08-31 DIAGNOSIS — Z13.1 SCREENING FOR DIABETES MELLITUS: ICD-10-CM

## 2023-08-31 DIAGNOSIS — Z00.00 ROUTINE GENERAL MEDICAL EXAMINATION AT A HEALTH CARE FACILITY: Primary | ICD-10-CM

## 2023-08-31 DIAGNOSIS — Z13.6 CARDIOVASCULAR SCREENING; LDL GOAL LESS THAN 160: ICD-10-CM

## 2023-08-31 LAB
CHOLEST SERPL-MCNC: 159 MG/DL
FASTING STATUS PATIENT QL REPORTED: NO
GLUCOSE SERPL-MCNC: 103 MG/DL (ref 70–99)
HDLC SERPL-MCNC: 76 MG/DL
LDLC SERPL CALC-MCNC: 68 MG/DL
NONHDLC SERPL-MCNC: 83 MG/DL
TRIGL SERPL-MCNC: 75 MG/DL
TSH SERPL DL<=0.005 MIU/L-ACNC: 1.63 UIU/ML (ref 0.3–4.2)

## 2023-08-31 PROCEDURE — 99395 PREV VISIT EST AGE 18-39: CPT | Mod: 25 | Performed by: FAMILY MEDICINE

## 2023-08-31 PROCEDURE — 82947 ASSAY GLUCOSE BLOOD QUANT: CPT | Performed by: FAMILY MEDICINE

## 2023-08-31 PROCEDURE — 84443 ASSAY THYROID STIM HORMONE: CPT | Performed by: FAMILY MEDICINE

## 2023-08-31 PROCEDURE — 90746 HEPB VACCINE 3 DOSE ADULT IM: CPT | Performed by: FAMILY MEDICINE

## 2023-08-31 PROCEDURE — 90471 IMMUNIZATION ADMIN: CPT | Performed by: FAMILY MEDICINE

## 2023-08-31 PROCEDURE — 80061 LIPID PANEL: CPT | Performed by: FAMILY MEDICINE

## 2023-08-31 PROCEDURE — 36415 COLL VENOUS BLD VENIPUNCTURE: CPT | Performed by: FAMILY MEDICINE

## 2023-08-31 PROCEDURE — 86803 HEPATITIS C AB TEST: CPT | Performed by: FAMILY MEDICINE

## 2023-08-31 RX ORDER — HYDROXYZINE PAMOATE 50 MG/1
50 CAPSULE ORAL
Qty: 90 CAPSULE | Refills: 3 | Status: SHIPPED | OUTPATIENT
Start: 2023-08-31

## 2023-08-31 RX ORDER — TRAZODONE HYDROCHLORIDE 50 MG/1
TABLET, FILM COATED ORAL
Qty: 90 TABLET | Refills: 3 | Status: SHIPPED | OUTPATIENT
Start: 2023-08-31

## 2023-08-31 ASSESSMENT — ENCOUNTER SYMPTOMS
CHILLS: 0
DIARRHEA: 0
ARTHRALGIAS: 0
WEAKNESS: 0
NAUSEA: 0
FREQUENCY: 0
JOINT SWELLING: 0
CONSTIPATION: 0
PALPITATIONS: 0
EYE PAIN: 0
SORE THROAT: 0
ABDOMINAL PAIN: 0
COUGH: 0
HEMATURIA: 0
SHORTNESS OF BREATH: 0
MYALGIAS: 0
HEADACHES: 0
HEMATOCHEZIA: 0
DYSURIA: 0
FEVER: 0
NERVOUS/ANXIOUS: 0
DIZZINESS: 0
PARESTHESIAS: 0
HEARTBURN: 0

## 2023-08-31 ASSESSMENT — PAIN SCALES - GENERAL: PAINLEVEL: NO PAIN (0)

## 2023-08-31 ASSESSMENT — ASTHMA QUESTIONNAIRES: ACT_TOTALSCORE: 25

## 2023-08-31 NOTE — PATIENT INSTRUCTIONS
ASSESSMENT AND PLAN  1. Routine general medical examination at a health care facility      2. Need for hepatitis C screening test    - Hepatitis C Screen Reflex to HCV RNA Quant and Genotype; Future    3. Primary insomnia  Alternate trazodone with hydroxzine to maintain effectiveness.   - traZODone (DESYREL) 50 MG tablet; TAKE 1 TABLET BY MOUTH AT BEDTIME AS NEEDED FOR SLEEP  Dispense: 90 tablet; Refill: 3  - hydrOXYzine (VISTARIL) 50 MG capsule; Take 1 capsule (50 mg) by mouth nightly as needed (insomnia)  Dispense: 90 capsule; Refill: 3    4. Overweight (BMI 25.0-29.9)    - Glucose; Future  - TSH with free T4 reflex; Future    5. Screening for diabetes mellitus    - Glucose; Future    6. CARDIOVASCULAR SCREENING; LDL GOAL LESS THAN 160    - Lipid panel reflex to direct LDL Fasting; Future    7.  Asthma:  well controlled only with heavy exercise, recommended trial of albuterol prior to such exercise.

## 2023-08-31 NOTE — PROGRESS NOTES
SUBJECTIVE:   CC: Rosalio is an 38 year old who presents for preventative health visit.       8/31/2023     2:09 PM   Additional Questions   Roomed by graham casillas       Healthy Habits:     Getting at least 3 servings of Calcium per day:  Yes    Bi-annual eye exam:  Yes    Dental care twice a year:  Yes    Sleep apnea or symptoms of sleep apnea:  None    Diet:  Other    Frequency of exercise:  6-7 days/week    Duration of exercise:  Greater than 60 minutes    Taking medications regularly:  Yes    Medication side effects:  None    Additional concerns today:  No      Today's PHQ-2 Score:       8/31/2023     2:03 PM   PHQ-2 ( 1999 Pfizer)   Q1: Little interest or pleasure in doing things 0   Q2: Feeling down, depressed or hopeless 0   PHQ-2 Score 0   Q1: Little interest or pleasure in doing things Not at all   Q2: Feeling down, depressed or hopeless Not at all   PHQ-2 Score 0                 PROBLEMS TO ADD ON...  -------------------------------------  Overall well.  Exercising more!       Social History     Tobacco Use    Smoking status: Never    Smokeless tobacco: Never   Substance Use Topics    Alcohol use: Yes     Comment: rare             8/31/2023     2:03 PM   Alcohol Use   Prescreen: >3 drinks/day or >7 drinks/week? No       Last PSA: No results found for: PSA    Reviewed orders with patient. Reviewed health maintenance and updated orders accordingly - Yes      Reviewed and updated as needed this visit by clinical staff   Tobacco  Allergies  Meds              Reviewed and updated as needed this visit by Provider                 Past Medical History:   Diagnosis Date    Depressive disorder       No past surgical history on file.    Review of Systems   Constitutional:  Negative for chills and fever.   HENT:  Negative for congestion, ear pain, hearing loss and sore throat.    Eyes:  Negative for pain and visual disturbance.   Respiratory:  Negative for cough and shortness of breath.    Cardiovascular:  Negative  "for chest pain, palpitations and peripheral edema.   Gastrointestinal:  Negative for abdominal pain, constipation, diarrhea, heartburn, hematochezia and nausea.   Genitourinary:  Negative for dysuria, frequency, genital sores, hematuria, impotence, penile discharge and urgency.   Musculoskeletal:  Negative for arthralgias, joint swelling and myalgias.   Skin:  Negative for rash.   Neurological:  Negative for dizziness, weakness, headaches and paresthesias.   Psychiatric/Behavioral:  Negative for mood changes. The patient is not nervous/anxious.          OBJECTIVE:   Ht 1.778 m (5' 10\")   Wt 88.5 kg (195 lb)   BMI 27.98 kg/m      Physical Exam  GENERAL: healthy, alert and no distress  EYES: Eyes grossly normal to inspection, PERRL and conjunctivae and sclerae normal  HENT: ear canals and TM's normal, nose and mouth without ulcers or lesions  NECK: no adenopathy, no asymmetry, masses, or scars and thyroid normal to palpation  RESP: lungs clear to auscultation - no rales, rhonchi or wheezes  CV: regular rate and rhythm, normal S1 S2, no S3 or S4, no murmur, click or rub, no peripheral edema and peripheral pulses strong  ABDOMEN: soft, nontender, no hepatosplenomegaly, no masses and bowel sounds normal   (male): normal male genitalia without lesions or urethral discharge, no hernia  MS: no gross musculoskeletal defects noted, no edema  SKIN: no suspicious lesions or rashes  NEURO: Normal strength and tone, mentation intact and speech normal  PSYCH: mentation appears normal, affect normal/bright        ASSESSMENT/PLAN:   ASSESSMENT AND PLAN  1. Routine general medical examination at a health care facility      2. Need for hepatitis C screening test    - Hepatitis C Screen Reflex to HCV RNA Quant and Genotype; Future    3. Primary insomnia  Alternate trazodone with hydroxzine to maintain effectiveness.   - traZODone (DESYREL) 50 MG tablet; TAKE 1 TABLET BY MOUTH AT BEDTIME AS NEEDED FOR SLEEP  Dispense: 90 tablet; " "Refill: 3  - hydrOXYzine (VISTARIL) 50 MG capsule; Take 1 capsule (50 mg) by mouth nightly as needed (insomnia)  Dispense: 90 capsule; Refill: 3    4. Overweight (BMI 25.0-29.9)    - Glucose; Future  - TSH with free T4 reflex; Future    5. Screening for diabetes mellitus    - Glucose; Future    6. CARDIOVASCULAR SCREENING; LDL GOAL LESS THAN 160    - Lipid panel reflex to direct LDL Fasting; Future    7.  Asthma:  well controlled only with heavy exercise, recommended trial of albuterol prior to such exercise.             Patient has been advised of split billing requirements and indicates understanding: Yes      COUNSELING:   Reviewed preventive health counseling, as reflected in patient instructions       Regular exercise       Healthy diet/nutrition       Immunizations             Colorectal cancer screening       Prostate cancer screening      BMI:   Estimated body mass index is 27.98 kg/m  as calculated from the following:    Height as of this encounter: 1.778 m (5' 10\").    Weight as of this encounter: 88.5 kg (195 lb).   Weight management plan: Discussed healthy diet and exercise guidelines      He reports that he has never smoked. He has never used smokeless tobacco.            Joel Daniel Wegener, MD  [facility]  "

## 2023-09-01 LAB — HCV AB SERPL QL IA: NONREACTIVE

## 2024-08-01 ENCOUNTER — PATIENT OUTREACH (OUTPATIENT)
Dept: CARE COORDINATION | Facility: CLINIC | Age: 39
End: 2024-08-01
Payer: COMMERCIAL

## 2024-08-15 ENCOUNTER — PATIENT OUTREACH (OUTPATIENT)
Dept: CARE COORDINATION | Facility: CLINIC | Age: 39
End: 2024-08-15
Payer: COMMERCIAL

## 2024-10-11 ENCOUNTER — OFFICE VISIT (OUTPATIENT)
Dept: FAMILY MEDICINE | Facility: CLINIC | Age: 39
End: 2024-10-11
Payer: COMMERCIAL

## 2024-10-11 VITALS
TEMPERATURE: 97.3 F | OXYGEN SATURATION: 98 % | BODY MASS INDEX: 27.87 KG/M2 | HEIGHT: 70 IN | DIASTOLIC BLOOD PRESSURE: 74 MMHG | RESPIRATION RATE: 10 BRPM | SYSTOLIC BLOOD PRESSURE: 123 MMHG | HEART RATE: 73 BPM | WEIGHT: 194.7 LBS

## 2024-10-11 DIAGNOSIS — F51.01 PRIMARY INSOMNIA: ICD-10-CM

## 2024-10-11 DIAGNOSIS — R73.9 ELEVATED BLOOD SUGAR: ICD-10-CM

## 2024-10-11 DIAGNOSIS — Z00.00 ROUTINE GENERAL MEDICAL EXAMINATION AT A HEALTH CARE FACILITY: Primary | ICD-10-CM

## 2024-10-11 DIAGNOSIS — Z13.6 CARDIOVASCULAR SCREENING; LDL GOAL LESS THAN 160: ICD-10-CM

## 2024-10-11 DIAGNOSIS — J45.990 EXERCISE-INDUCED ASTHMA: ICD-10-CM

## 2024-10-11 DIAGNOSIS — Z13.1 SCREENING FOR DIABETES MELLITUS: ICD-10-CM

## 2024-10-11 LAB
EST. AVERAGE GLUCOSE BLD GHB EST-MCNC: 94 MG/DL
HBA1C MFR BLD: 4.9 % (ref 0–5.6)

## 2024-10-11 PROCEDURE — 90746 HEPB VACCINE 3 DOSE ADULT IM: CPT | Performed by: FAMILY MEDICINE

## 2024-10-11 PROCEDURE — 91320 SARSCV2 VAC 30MCG TRS-SUC IM: CPT | Performed by: FAMILY MEDICINE

## 2024-10-11 PROCEDURE — 90715 TDAP VACCINE 7 YRS/> IM: CPT | Performed by: FAMILY MEDICINE

## 2024-10-11 PROCEDURE — 99395 PREV VISIT EST AGE 18-39: CPT | Mod: 25 | Performed by: FAMILY MEDICINE

## 2024-10-11 PROCEDURE — 36415 COLL VENOUS BLD VENIPUNCTURE: CPT | Performed by: FAMILY MEDICINE

## 2024-10-11 PROCEDURE — 90472 IMMUNIZATION ADMIN EACH ADD: CPT | Performed by: FAMILY MEDICINE

## 2024-10-11 PROCEDURE — 90656 IIV3 VACC NO PRSV 0.5 ML IM: CPT | Performed by: FAMILY MEDICINE

## 2024-10-11 PROCEDURE — 80061 LIPID PANEL: CPT | Performed by: FAMILY MEDICINE

## 2024-10-11 PROCEDURE — 90480 ADMN SARSCOV2 VAC 1/ONLY CMP: CPT | Performed by: FAMILY MEDICINE

## 2024-10-11 PROCEDURE — 90471 IMMUNIZATION ADMIN: CPT | Performed by: FAMILY MEDICINE

## 2024-10-11 PROCEDURE — 83036 HEMOGLOBIN GLYCOSYLATED A1C: CPT | Performed by: FAMILY MEDICINE

## 2024-10-11 RX ORDER — ALBUTEROL SULFATE 90 UG/1
2 INHALANT RESPIRATORY (INHALATION) EVERY 6 HOURS PRN
Qty: 18 G | Refills: 3 | Status: SHIPPED | OUTPATIENT
Start: 2024-10-11

## 2024-10-11 RX ORDER — TRAZODONE HYDROCHLORIDE 50 MG/1
TABLET, FILM COATED ORAL
Qty: 90 TABLET | Refills: 3 | Status: SHIPPED | OUTPATIENT
Start: 2024-10-11

## 2024-10-11 SDOH — HEALTH STABILITY: PHYSICAL HEALTH: ON AVERAGE, HOW MANY DAYS PER WEEK DO YOU ENGAGE IN MODERATE TO STRENUOUS EXERCISE (LIKE A BRISK WALK)?: 6 DAYS

## 2024-10-11 ASSESSMENT — PATIENT HEALTH QUESTIONNAIRE - PHQ9
SUM OF ALL RESPONSES TO PHQ QUESTIONS 1-9: 0
SUM OF ALL RESPONSES TO PHQ QUESTIONS 1-9: 0
10. IF YOU CHECKED OFF ANY PROBLEMS, HOW DIFFICULT HAVE THESE PROBLEMS MADE IT FOR YOU TO DO YOUR WORK, TAKE CARE OF THINGS AT HOME, OR GET ALONG WITH OTHER PEOPLE: NOT DIFFICULT AT ALL

## 2024-10-11 ASSESSMENT — ANXIETY QUESTIONNAIRES
IF YOU CHECKED OFF ANY PROBLEMS ON THIS QUESTIONNAIRE, HOW DIFFICULT HAVE THESE PROBLEMS MADE IT FOR YOU TO DO YOUR WORK, TAKE CARE OF THINGS AT HOME, OR GET ALONG WITH OTHER PEOPLE: NOT DIFFICULT AT ALL
3. WORRYING TOO MUCH ABOUT DIFFERENT THINGS: NOT AT ALL
8. IF YOU CHECKED OFF ANY PROBLEMS, HOW DIFFICULT HAVE THESE MADE IT FOR YOU TO DO YOUR WORK, TAKE CARE OF THINGS AT HOME, OR GET ALONG WITH OTHER PEOPLE?: NOT DIFFICULT AT ALL
5. BEING SO RESTLESS THAT IT IS HARD TO SIT STILL: NOT AT ALL
2. NOT BEING ABLE TO STOP OR CONTROL WORRYING: NOT AT ALL
7. FEELING AFRAID AS IF SOMETHING AWFUL MIGHT HAPPEN: NOT AT ALL
4. TROUBLE RELAXING: NOT AT ALL
GAD7 TOTAL SCORE: 0
GAD7 TOTAL SCORE: 0
1. FEELING NERVOUS, ANXIOUS, OR ON EDGE: NOT AT ALL
7. FEELING AFRAID AS IF SOMETHING AWFUL MIGHT HAPPEN: NOT AT ALL
GAD7 TOTAL SCORE: 0
6. BECOMING EASILY ANNOYED OR IRRITABLE: NOT AT ALL

## 2024-10-11 ASSESSMENT — ASTHMA QUESTIONNAIRES
QUESTION_2 LAST FOUR WEEKS HOW OFTEN HAVE YOU HAD SHORTNESS OF BREATH: NOT AT ALL
ACT_TOTALSCORE: 25
QUESTION_3 LAST FOUR WEEKS HOW OFTEN DID YOUR ASTHMA SYMPTOMS (WHEEZING, COUGHING, SHORTNESS OF BREATH, CHEST TIGHTNESS OR PAIN) WAKE YOU UP AT NIGHT OR EARLIER THAN USUAL IN THE MORNING: NOT AT ALL
ACT_TOTALSCORE: 25
QUESTION_1 LAST FOUR WEEKS HOW MUCH OF THE TIME DID YOUR ASTHMA KEEP YOU FROM GETTING AS MUCH DONE AT WORK, SCHOOL OR AT HOME: NONE OF THE TIME
QUESTION_4 LAST FOUR WEEKS HOW OFTEN HAVE YOU USED YOUR RESCUE INHALER OR NEBULIZER MEDICATION (SUCH AS ALBUTEROL): NOT AT ALL
QUESTION_5 LAST FOUR WEEKS HOW WOULD YOU RATE YOUR ASTHMA CONTROL: COMPLETELY CONTROLLED

## 2024-10-11 ASSESSMENT — PAIN SCALES - GENERAL: PAINLEVEL: NO PAIN (0)

## 2024-10-11 ASSESSMENT — SOCIAL DETERMINANTS OF HEALTH (SDOH): HOW OFTEN DO YOU GET TOGETHER WITH FRIENDS OR RELATIVES?: ONCE A WEEK

## 2024-10-11 NOTE — PATIENT INSTRUCTIONS
Discussed mild intermittent heart burn with caffeine/exercise as possible triggers.   Discussed other possible triggers/substances to avoid.     If needed would recommend tums and ranitidine    Very mild exercised induced asthma   albuterol refilled as needed.     Trazodone working well for sleep.     Hydroxyzine caused side effects so not using/discontinued.   Patient Education   Preventive Care Advice   This is general advice given by our system to help you stay healthy. However, your care team may have specific advice just for you. Please talk to your care team about your preventive care needs.  Nutrition  Eat 5 or more servings of fruits and vegetables each day.  Try wheat bread, brown rice and whole grain pasta (instead of white bread, rice, and pasta).  Get enough calcium and vitamin D. Check the label on foods and aim for 100% of the RDA (recommended daily allowance).  Lifestyle  Exercise at least 150 minutes each week  (30 minutes a day, 5 days a week).  Do muscle strengthening activities 2 days a week. These help control your weight and prevent disease.  No smoking.  Wear sunscreen to prevent skin cancer.  Have a dental exam and cleaning every 6 months.  Yearly exams  See your health care team every year to talk about:  Any changes in your health.  Any medicines your care team has prescribed.  Preventive care, family planning, and ways to prevent chronic diseases.  Shots (vaccines)   HPV shots (up to age 26), if you've never had them before.  Hepatitis B shots (up to age 59), if you've never had them before.  COVID-19 shot: Get this shot when it's due.  Flu shot: Get a flu shot every year.  Tetanus shot: Get a tetanus shot every 10 years.  Pneumococcal, hepatitis A, and RSV shots: Ask your care team if you need these based on your risk.  Shingles shot (for age 50 and up)  General health tests  Diabetes screening:  Starting at age 35, Get screened for diabetes at least every 3 years.  If you are younger  than age 35, ask your care team if you should be screened for diabetes.  Cholesterol test: At age 39, start having a cholesterol test every 5 years, or more often if advised.  Bone density scan (DEXA): At age 50, ask your care team if you should have this scan for osteoporosis (brittle bones).  Hepatitis C: Get tested at least once in your life.  STIs (sexually transmitted infections)  Before age 24: Ask your care team if you should be screened for STIs.  After age 24: Get screened for STIs if you're at risk. You are at risk for STIs (including HIV) if:  You are sexually active with more than one person.  You don't use condoms every time.  You or a partner was diagnosed with a sexually transmitted infection.  If you are at risk for HIV, ask about PrEP medicine to prevent HIV.  Get tested for HIV at least once in your life, whether you are at risk for HIV or not.  Cancer screening tests  Cervical cancer screening: If you have a cervix, begin getting regular cervical cancer screening tests starting at age 21.  Breast cancer scan (mammogram): If you've ever had breasts, begin having regular mammograms starting at age 40. This is a scan to check for breast cancer.  Colon cancer screening: It is important to start screening for colon cancer at age 45.  Have a colonoscopy test every 10 years (or more often if you're at risk) Or, ask your provider about stool tests like a FIT test every year or Cologuard test every 3 years.  To learn more about your testing options, visit:   .  For help making a decision, visit:   https://bit.ly/kh70568.  Prostate cancer screening test: If you have a prostate, ask your care team if a prostate cancer screening test (PSA) at age 55 is right for you.  Lung cancer screening: If you are a current or former smoker ages 50 to 80, ask your care team if ongoing lung cancer screenings are right for you.  For informational purposes only. Not to replace the advice of your health care provider.  Copyright   2023 [facility]. All rights reserved. Clinically reviewed by the [facility] Transitions Program. [email or web address] [id number] - REV 01/24.

## 2024-10-11 NOTE — PROGRESS NOTES
"Preventive Care Visit  [facility]  Joel Daniel Wegener, MD, Family Medicine  Oct 11, 2024      Assessment & Plan     Routine general medical examination at a health care facility    Discussed mild intermittent heart burn with caffeine/exercise as possible triggers.   Discussed other possible triggers/substances to avoid.     If needed would recommend tums and ranitidine    Very mild exercised induced asthma   albuterol refilled as needed.     Trazodone working well for sleep.     Hydroxyzine caused side effects so not using/discontinued.     Exercise-induced asthma    - albuterol (PROAIR HFA/PROVENTIL HFA/VENTOLIN HFA) 108 (90 Base) MCG/ACT inhaler; Inhale 2 puffs into the lungs every 6 hours as needed for shortness of breath, wheezing or cough.    Primary insomnia    - traZODone (DESYREL) 50 MG tablet; TAKE 1 TABLET BY MOUTH AT BEDTIME AS NEEDED FOR SLEEP    Screening for diabetes mellitus      Elevated blood sugar    - Hemoglobin A1c; Future  - Hemoglobin A1c    CARDIOVASCULAR SCREENING; LDL GOAL LESS THAN 160    - Lipid panel reflex to direct LDL Fasting; Future  - Lipid panel reflex to direct LDL Fasting    Patient has been advised of split billing requirements and indicates understanding: Yes        BMI  Estimated body mass index is 27.94 kg/m  as calculated from the following:    Height as of this encounter: 1.778 m (5' 10\").    Weight as of this encounter: 88.3 kg (194 lb 11.2 oz).   Weight management plan: Discussed healthy diet and exercise guidelines, bmi overestimates body fat since muscular    Counseling  Appropriate preventive services were addressed with this patient via screening, questionnaire, or discussion as appropriate for fall prevention, nutrition, physical activity, Tobacco-use cessation, social engagement, weight loss and cognition.  Checklist reviewing preventive services available has been given to the patient.  Reviewed patient's diet, addressing concerns and/or " questions.           Subjective   Rosalio is a 39 year old, presenting for the following:  Physical        10/11/2024     2:02 PM   Additional Questions   Roomed by Thom BURNS        Health Care Directive  Patient does not have a Health Care Directive or Living Will: Discussed advance care planning with patient; information given to patient to review.  Answers submitted by the patient for this visit:  Patient Health Questionnaire (Submitted on 10/11/2024)  If you checked off any problems, how difficult have these problems made it for you to do your work, take care of things at home, or get along with other people?: Not difficult at all  PHQ9 TOTAL SCORE: 0  Patient Health Questionnaire (G7) (Submitted on 10/11/2024)  ANGELINA 7 TOTAL SCORE: 0    HPI              10/11/2024   General Health   How would you rate your overall physical health? Excellent   Feel stress (tense, anxious, or unable to sleep) To some extent      (!) STRESS CONCERN      10/11/2024   Nutrition   Three or more servings of calcium each day? Yes   Diet: Carbohydrate counting   How many servings of fruit and vegetables per day? (!) 2-3   How many sweetened beverages each day? 0-1            10/11/2024   Exercise   Days per week of moderate/strenous exercise 6 days            10/11/2024   Social Factors   Frequency of gathering with friends or relatives Once a week   Worry food won't last until get money to buy more No   Food not last or not have enough money for food? No   Do you have housing? (Housing is defined as stable permanent housing and does not include staying ouside in a car, in a tent, in an abandoned building, in an overnight shelter, or couch-surfing.) Yes   Are you worried about losing your housing? No   Lack of transportation? No   Unable to get utilities (heat,electricity)? No            10/11/2024   Dental   Dentist two times every year? Yes            10/11/2024   TB Screening   Were you born outside of the [place]? No          Today's PHQ-9  "Score:       10/11/2024     1:58 PM   PHQ-9 SCORE   PHQ-9 Total Score MyChart 0   PHQ-9 Total Score 0         10/11/2024   Substance Use   Alcohol more than 3/day or more than 7/wk No   Do you use any other substances recreationally? No        Social History     Tobacco Use    Smoking status: Never    Smokeless tobacco: Never   Substance Use Topics    Alcohol use: Yes     Comment: rare    Drug use: No           10/11/2024   STI Screening   New sexual partner(s) since last STI/HIV test? No            10/11/2024   Contraception/Family Planning   Questions about contraception or family planning No           Reviewed and updated as needed this visit by Provider                    Past Medical History:   Diagnosis Date    Depressive disorder      No past surgical history on file.      Review of Systems  Constitutional, neuro, ENT, endocrine, pulmonary, cardiac, gastrointestinal, genitourinary, musculoskeletal, integument and psychiatric systems are negative, except as otherwise noted.     Objective    Exam  /74   Pulse 73   Temp 97.3  F (36.3  C) (Temporal)   Resp 10   Ht 1.778 m (5' 10\")   Wt 88.3 kg (194 lb 11.2 oz)   SpO2 98%   BMI 27.94 kg/m     Estimated body mass index is 27.94 kg/m  as calculated from the following:    Height as of this encounter: 1.778 m (5' 10\").    Weight as of this encounter: 88.3 kg (194 lb 11.2 oz).    Physical Exam  GENERAL: alert and no distress  EYES: Eyes grossly normal to inspection, PERRL and conjunctivae and sclerae normal  HENT: ear canals and TM's normal, nose and mouth without ulcers or lesions  NECK: no adenopathy, no asymmetry, masses, or scars  RESP: lungs clear to auscultation - no rales, rhonchi or wheezes  CV: regular rate and rhythm, normal S1 S2, no S3 or S4, no murmur, click or rub, no peripheral edema  ABDOMEN: soft, nontender, no hepatosplenomegaly, no masses and bowel sounds normal  MS: no gross musculoskeletal defects noted, no edema  SKIN: no suspicious " lesions or rashes  NEURO: Normal strength and tone, mentation intact and speech normal  PSYCH: mentation appears normal, affect normal/bright        Signed Electronically by: Joel Daniel Wegener, MD

## 2024-10-14 LAB
CHOLEST SERPL-MCNC: 157 MG/DL
FASTING STATUS PATIENT QL REPORTED: NO
HDLC SERPL-MCNC: 59 MG/DL
LDLC SERPL CALC-MCNC: 86 MG/DL
NONHDLC SERPL-MCNC: 98 MG/DL
TRIGL SERPL-MCNC: 61 MG/DL